# Patient Record
Sex: FEMALE | Race: WHITE | NOT HISPANIC OR LATINO | Employment: FULL TIME | URBAN - METROPOLITAN AREA
[De-identification: names, ages, dates, MRNs, and addresses within clinical notes are randomized per-mention and may not be internally consistent; named-entity substitution may affect disease eponyms.]

---

## 2021-06-08 DIAGNOSIS — K43.9 VENTRAL HERNIA: Primary | ICD-10-CM

## 2022-04-01 ENCOUNTER — TELEPHONE (OUTPATIENT)
Dept: OBSTETRICS AND GYNECOLOGY | Facility: CLINIC | Age: 47
End: 2022-04-01
Payer: COMMERCIAL

## 2022-04-01 NOTE — TELEPHONE ENCOUNTER
----- Message from Bernadine Pedroza sent at 4/1/2022 12:44 PM CDT -----  Regarding: Referral  Contact: Jessica, Health Unit in [facility]  Per phone call with Jessica, she wanted to know if Dr Lara is accepting new patient and she sent a referral over and was told that Dr Lara is not accepting new patient.  Please advised and return call at [phone].    Thanks,  SJ

## 2022-04-01 NOTE — TELEPHONE ENCOUNTER
Phone call returned. Jessica is requesting a new patient referral.  She is advised Dr Lara is not accepting new patients at this time.

## 2022-04-11 ENCOUNTER — PATIENT MESSAGE (OUTPATIENT)
Dept: OBSTETRICS AND GYNECOLOGY | Facility: CLINIC | Age: 47
End: 2022-04-11
Payer: COMMERCIAL

## 2022-04-21 ENCOUNTER — OFFICE VISIT (OUTPATIENT)
Dept: OBSTETRICS AND GYNECOLOGY | Facility: CLINIC | Age: 47
End: 2022-04-21
Payer: COMMERCIAL

## 2022-04-21 VITALS — HEART RATE: 79 BPM | SYSTOLIC BLOOD PRESSURE: 122 MMHG | WEIGHT: 273 LBS | DIASTOLIC BLOOD PRESSURE: 84 MMHG

## 2022-04-21 DIAGNOSIS — R87.611 ATYPICAL SQUAMOUS CELLS CANNOT EXCLUDE HIGH GRADE SQUAMOUS INTRAEPITHELIAL LESION ON CYTOLOGIC SMEAR OF CERVIX (ASC-H): Primary | ICD-10-CM

## 2022-04-21 PROCEDURE — 99499 UNLISTED E&M SERVICE: CPT | Mod: S$GLB,,, | Performed by: OBSTETRICS & GYNECOLOGY

## 2022-04-21 PROCEDURE — 3074F SYST BP LT 130 MM HG: CPT | Mod: CPTII,S$GLB,, | Performed by: OBSTETRICS & GYNECOLOGY

## 2022-04-21 PROCEDURE — 1159F PR MEDICATION LIST DOCUMENTED IN MEDICAL RECORD: ICD-10-PCS | Mod: CPTII,S$GLB,, | Performed by: OBSTETRICS & GYNECOLOGY

## 2022-04-21 PROCEDURE — 3079F PR MOST RECENT DIASTOLIC BLOOD PRESSURE 80-89 MM HG: ICD-10-PCS | Mod: CPTII,S$GLB,, | Performed by: OBSTETRICS & GYNECOLOGY

## 2022-04-21 PROCEDURE — 99499 NO LOS: ICD-10-PCS | Mod: S$GLB,,, | Performed by: OBSTETRICS & GYNECOLOGY

## 2022-04-21 PROCEDURE — 1159F MED LIST DOCD IN RCRD: CPT | Mod: CPTII,S$GLB,, | Performed by: OBSTETRICS & GYNECOLOGY

## 2022-04-21 PROCEDURE — 57452 EXAM OF CERVIX W/SCOPE: CPT | Mod: S$GLB,,, | Performed by: OBSTETRICS & GYNECOLOGY

## 2022-04-21 PROCEDURE — 57452 COLPOSCOPY: ICD-10-PCS | Mod: S$GLB,,, | Performed by: OBSTETRICS & GYNECOLOGY

## 2022-04-21 PROCEDURE — 3074F PR MOST RECENT SYSTOLIC BLOOD PRESSURE < 130 MM HG: ICD-10-PCS | Mod: CPTII,S$GLB,, | Performed by: OBSTETRICS & GYNECOLOGY

## 2022-04-21 PROCEDURE — 3079F DIAST BP 80-89 MM HG: CPT | Mod: CPTII,S$GLB,, | Performed by: OBSTETRICS & GYNECOLOGY

## 2022-04-21 RX ORDER — LEVOTHYROXINE SODIUM 75 UG/1
75 TABLET ORAL DAILY
COMMUNITY
Start: 2022-03-30 | End: 2023-05-18 | Stop reason: SDUPTHER

## 2022-04-21 RX ORDER — SERTRALINE HYDROCHLORIDE 100 MG/1
100 TABLET, FILM COATED ORAL DAILY
COMMUNITY
Start: 2022-01-17 | End: 2023-05-18

## 2022-04-21 RX ORDER — CALCIUM CARB/VITAMIN D3/VIT K1 500-500-40
1 TABLET,CHEWABLE ORAL DAILY
COMMUNITY
End: 2023-05-18

## 2022-04-21 RX ORDER — LEVOTHYROXINE SODIUM 75 UG/1
75 TABLET ORAL
COMMUNITY
End: 2023-12-11

## 2022-04-21 RX ORDER — METFORMIN HYDROCHLORIDE 500 MG/1
500 TABLET ORAL
COMMUNITY

## 2022-04-21 RX ORDER — METOPROLOL SUCCINATE 25 MG/1
25 TABLET, EXTENDED RELEASE ORAL DAILY
COMMUNITY
Start: 2022-01-17 | End: 2023-06-15 | Stop reason: SDUPTHER

## 2022-04-21 RX ORDER — CLOBETASOL PROPIONATE 0.5 MG/G
CREAM TOPICAL
COMMUNITY
End: 2023-05-18

## 2022-04-21 RX ORDER — BUSPIRONE HYDROCHLORIDE 10 MG/1
TABLET ORAL
COMMUNITY
Start: 2022-04-19 | End: 2023-05-18 | Stop reason: ALTCHOICE

## 2022-04-21 RX ORDER — METOPROLOL TARTRATE 25 MG/1
25 TABLET, FILM COATED ORAL
COMMUNITY
End: 2022-06-15 | Stop reason: SDUPTHER

## 2022-04-21 RX ORDER — FLUTICASONE FUROATE, UMECLIDINIUM BROMIDE AND VILANTEROL TRIFENATATE 100; 62.5; 25 UG/1; UG/1; UG/1
POWDER RESPIRATORY (INHALATION)
COMMUNITY
Start: 2022-03-26

## 2022-04-21 RX ORDER — DICLOFENAC SODIUM 75 MG/1
TABLET, DELAYED RELEASE ORAL
COMMUNITY
Start: 2021-12-17 | End: 2023-05-18

## 2022-04-21 RX ORDER — HYDROXYCHLOROQUINE SULFATE 200 MG/1
TABLET, FILM COATED ORAL
COMMUNITY

## 2022-04-21 RX ORDER — KETOCONAZOLE 20 MG/G
CREAM TOPICAL
COMMUNITY
End: 2023-05-18

## 2022-04-21 RX ORDER — SPIRONOLACTONE 100 MG/1
100 TABLET, FILM COATED ORAL
COMMUNITY
End: 2023-12-11

## 2022-04-21 NOTE — PROCEDURES
Colposcopy    Date/Time: 4/21/2022 1:30 PM  Performed by: Avinash Hernandez MD  Authorized by: Avinash Hernandez MD     Consent Done?:  Yes (Verbal)  Assistants?: Yes      Colposcopy Site:  Cervix  Position:  Supine  Acrowhite Lesion: No    Atypical Vessels: No    Transformation Zone Adequate?: Yes    Biopsy?: No    ECC Performed?: No    LEEP Performed?: No     Patient tolerated the procedure well with no immediate complications.   Post-operative instructions were provided for the patient.   Patient was discharged and will follow up if any complications occur

## 2022-06-15 ENCOUNTER — OFFICE VISIT (OUTPATIENT)
Dept: GASTROENTEROLOGY | Facility: CLINIC | Age: 47
End: 2022-06-15
Payer: COMMERCIAL

## 2022-06-15 VITALS
HEIGHT: 63 IN | DIASTOLIC BLOOD PRESSURE: 71 MMHG | OXYGEN SATURATION: 96 % | WEIGHT: 278 LBS | HEART RATE: 81 BPM | SYSTOLIC BLOOD PRESSURE: 128 MMHG | BODY MASS INDEX: 49.26 KG/M2

## 2022-06-15 DIAGNOSIS — R10.13 ABDOMINAL PAIN, EPIGASTRIC: Primary | ICD-10-CM

## 2022-06-15 DIAGNOSIS — R13.19 OTHER DYSPHAGIA: ICD-10-CM

## 2022-06-15 DIAGNOSIS — R10.11 RIGHT UPPER QUADRANT PAIN: ICD-10-CM

## 2022-06-15 DIAGNOSIS — Z12.11 SCREENING FOR COLON CANCER: ICD-10-CM

## 2022-06-15 DIAGNOSIS — K76.0 FATTY LIVER: ICD-10-CM

## 2022-06-15 PROCEDURE — 1160F PR REVIEW ALL MEDS BY PRESCRIBER/CLIN PHARMACIST DOCUMENTED: ICD-10-PCS | Mod: CPTII,S$GLB,, | Performed by: INTERNAL MEDICINE

## 2022-06-15 PROCEDURE — 3074F SYST BP LT 130 MM HG: CPT | Mod: CPTII,S$GLB,, | Performed by: INTERNAL MEDICINE

## 2022-06-15 PROCEDURE — 3008F BODY MASS INDEX DOCD: CPT | Mod: CPTII,S$GLB,, | Performed by: INTERNAL MEDICINE

## 2022-06-15 PROCEDURE — 1160F RVW MEDS BY RX/DR IN RCRD: CPT | Mod: CPTII,S$GLB,, | Performed by: INTERNAL MEDICINE

## 2022-06-15 PROCEDURE — 99213 PR OFFICE/OUTPT VISIT, EST, LEVL III, 20-29 MIN: ICD-10-PCS | Mod: S$GLB,,, | Performed by: INTERNAL MEDICINE

## 2022-06-15 PROCEDURE — 1159F MED LIST DOCD IN RCRD: CPT | Mod: CPTII,S$GLB,, | Performed by: INTERNAL MEDICINE

## 2022-06-15 PROCEDURE — 3008F PR BODY MASS INDEX (BMI) DOCUMENTED: ICD-10-PCS | Mod: CPTII,S$GLB,, | Performed by: INTERNAL MEDICINE

## 2022-06-15 PROCEDURE — 1159F PR MEDICATION LIST DOCUMENTED IN MEDICAL RECORD: ICD-10-PCS | Mod: CPTII,S$GLB,, | Performed by: INTERNAL MEDICINE

## 2022-06-15 PROCEDURE — 99213 OFFICE O/P EST LOW 20 MIN: CPT | Mod: S$GLB,,, | Performed by: INTERNAL MEDICINE

## 2022-06-15 PROCEDURE — 3078F PR MOST RECENT DIASTOLIC BLOOD PRESSURE < 80 MM HG: ICD-10-PCS | Mod: CPTII,S$GLB,, | Performed by: INTERNAL MEDICINE

## 2022-06-15 PROCEDURE — 3078F DIAST BP <80 MM HG: CPT | Mod: CPTII,S$GLB,, | Performed by: INTERNAL MEDICINE

## 2022-06-15 PROCEDURE — 3074F PR MOST RECENT SYSTOLIC BLOOD PRESSURE < 130 MM HG: ICD-10-PCS | Mod: CPTII,S$GLB,, | Performed by: INTERNAL MEDICINE

## 2022-06-15 RX ORDER — SOD SULF/POT CHLORIDE/MAG SULF 1.479 G
12 TABLET ORAL DAILY
Qty: 24 TABLET | Refills: 0 | Status: SHIPPED | OUTPATIENT
Start: 2022-06-15 | End: 2023-05-18

## 2022-06-15 NOTE — PROGRESS NOTES
Clinic Note    Reason for visit:  The primary encounter diagnosis was Abdominal pain, epigastric. Diagnoses of Other dysphagia, Right upper quadrant pain, Screening for colon cancer, and Fatty liver were also pertinent to this visit.    PCP: Channing Peterson   [facility] [address] / [place] [id number]    HPI:  This is a 46 y.o. female who is being seen for a follow up visit. H/o fatty liver, work up unrevealing. Patient reports intermittent nausea, resolves on its own. Comes and goes. Patient reports weight gain recently but has not changed diet. Currently taking GOLO supplement for weight loss (magnesium, zinc, and chromium). Also reports fatigue and frequent headaches. No prior colonoscopy. Patient also reports intermittent epigastric and RUQ pain, and at times has some chest pain. Had normal cardiac work up. Denies reflux/melena but occasionally has dysphagia with solids and liquids. Patient reports daily BMs, up to 3-4 times daily, loose stool. No blood in stool. Newly diagnosed with psoriatic arthritis. Patient wants to get gastric sleeve.     Outside record reviewed:  [facility] - 2017 MRCP: wnl, fatty liver, no GB. EGD: gitis, reflux eitis, ABx wnl, EBx wnl. HCV FibroSURE F0 A0, ALT 22, GGT 29, Tb 0.2. HCV/HAV neg, HBV sAg/cIgM/sAb neg, AMA/ZEKE/chol/Fe/T/%/ferr/INR nl. 34/32    Review of Systems   Constitutional: Positive for fatigue. Negative for chills, diaphoresis, fever and unexpected weight change.   HENT: Positive for trouble swallowing. Negative for mouth sores, nosebleeds, postnasal drip, sore throat and voice change.    Eyes: Positive for eye dryness. Negative for pain and discharge.   Respiratory: Positive for shortness of breath. Negative for apnea, cough, choking, chest tightness and wheezing.    Cardiovascular: Negative for chest pain, palpitations, leg swelling and claudication.   Gastrointestinal: Positive for abdominal pain, diarrhea and nausea. Negative for abdominal distention, anal  bleeding, blood in stool, change in bowel habit, constipation, rectal pain, vomiting, reflux, fecal incontinence and change in bowel habit.   Genitourinary: Negative for bladder incontinence, difficulty urinating, dysuria, flank pain, frequency and hematuria.   Musculoskeletal: Positive for joint swelling and joint deformity. Negative for arthralgias and back pain.   Integumentary:  Negative for color change, rash and wound.   Allergic/Immunologic: Negative for environmental allergies and food allergies.   Neurological: Positive for headaches. Negative for seizures, facial asymmetry, speech difficulty, weakness and memory loss.   Hematological: Positive for adenopathy. Bruises/bleeds easily.   Psychiatric/Behavioral: Positive for sleep disturbance. Negative for agitation, behavioral problems, confusion and hallucinations.      Past Medical History:   Diagnosis Date    Abnormal Pap smear of cervix     Anxiety     BMI 45.0-49.9, adult     Depression     Diabetes mellitus     Hashimoto's disease     Hypertension     Liver disease     fatty liver    Psoriatic arthritis     Thyroid disease      Past Surgical History:   Procedure Laterality Date    CHOLECYSTECTOMY      SHOULDER SURGERY Right     TONSILLECTOMY       Family History   Problem Relation Age of Onset    Diabetes Father     Heart disease Father     Hypertension Mother     Heart disease Mother     Heart disease Brother     Diabetes Brother      Social History     Tobacco Use    Smoking status: Never Smoker    Smokeless tobacco: Never Used   Substance Use Topics    Alcohol use: Yes     Alcohol/week: 1.0 standard drink     Types: 1 Glasses of wine per week    Drug use: Never     Review of patient's allergies indicates:  No Known Allergies     Medication List with Changes/Refills   New Medications    SOD SULF-POT CHLORIDE-MAG SULF (SUTAB) 1.479-0.188- 0.225 GRAM TABLET    Take 12 tablets by mouth once daily. Take according to package  "instructions with indicated amount of water. No breakfast day before test. May substitute with Suprep, Clenpiq, Plenvu, Moviprep or GoLytely based on Rx plan and patient preference.   Current Medications    BUSPIRONE (BUSPAR) 10 MG TABLET        CHOLECALCIFEROL, VITAMIN D3, 10 MCG (400 UNIT) CAP CAPSULE    Take 1 tablet by mouth once daily.    CLOBETASOL (TEMOVATE) 0.05 % CREAM    Apply topically.    DICLOFENAC (VOLTAREN) 75 MG EC TABLET    TAKE 1 TABLET BY MOUTH TWICE DAILY WITH FOOD FOR PAIN FOR 7 DAYS    EUTHYROX 75 MCG TABLET    Take 75 mcg by mouth once daily.    HYDROXYCHLOROQUINE (PLAQUENIL) 200 MG TABLET        KETOCONAZOLE (NIZORAL) 2 % CREAM    Apply topically.    LEVOTHYROXINE (SYNTHROID) 75 MCG TABLET    Take 75 mcg by mouth.    METFORMIN (GLUCOPHAGE) 500 MG TABLET    Take 500 mg by mouth.    METOPROLOL SUCCINATE (TOPROL-XL) 25 MG 24 HR TABLET    Take 25 mg by mouth once daily.    OMALIZUMAB (XOLAIR) 150 MG INJECTION    Inject 150 mg into the skin.    SERTRALINE (ZOLOFT) 100 MG TABLET    Take 100 mg by mouth once daily.    SPIRONOLACTONE (ALDACTONE) 100 MG TABLET    Take 100 mg by mouth.    TRELEGY ELLIPTA 100-62.5-25 MCG DSDV    INHALE 1 PUFF ONCE DAILY   Discontinued Medications    METOPROLOL TARTRATE (LOPRESSOR) 25 MG TABLET    Take 25 mg by mouth.         Vital Signs:  /71   Pulse 81   Ht 5' 3" (1.6 m)   Wt 126.1 kg (278 lb)   SpO2 96%   BMI 49.25 kg/m²         Physical Exam  Vitals reviewed.   Constitutional:       General: She is awake. She is not in acute distress.     Appearance: Normal appearance. She is well-developed. She is obese. She is not ill-appearing, toxic-appearing or diaphoretic.   HENT:      Head: Normocephalic and atraumatic.      Nose: Nose normal.      Mouth/Throat:      Mouth: Mucous membranes are moist.      Pharynx: Oropharynx is clear. No oropharyngeal exudate or posterior oropharyngeal erythema.   Eyes:      General: Lids are normal. Gaze aligned appropriately. No " scleral icterus.        Right eye: No discharge.         Left eye: No discharge.      Extraocular Movements: Extraocular movements intact.      Conjunctiva/sclera: Conjunctivae normal.   Neck:      Trachea: Trachea normal.   Cardiovascular:      Rate and Rhythm: Normal rate and regular rhythm.      Pulses:           Radial pulses are 2+ on the right side and 2+ on the left side.   Pulmonary:      Effort: Pulmonary effort is normal. No respiratory distress.      Breath sounds: Normal breath sounds. No stridor. No wheezing or rhonchi.   Chest:      Chest wall: No tenderness.   Abdominal:      General: Bowel sounds are normal. There is no distension.      Palpations: Abdomen is soft. There is no fluid wave, hepatomegaly or mass.      Tenderness: There is no abdominal tenderness. There is no guarding or rebound.   Musculoskeletal:         General: No tenderness or deformity.      Cervical back: Full passive range of motion without pain and neck supple. No tenderness.      Right lower leg: No edema.      Left lower leg: No edema.   Lymphadenopathy:      Cervical: No cervical adenopathy.   Skin:     General: Skin is warm and dry.      Capillary Refill: Capillary refill takes less than 2 seconds.      Coloration: Skin is not cyanotic, jaundiced or pale.      Findings: No rash.   Neurological:      General: No focal deficit present.      Mental Status: She is alert and oriented to person, place, and time.      Cranial Nerves: No facial asymmetry.      Motor: No tremor.   Psychiatric:         Attention and Perception: Attention normal.         Mood and Affect: Mood and affect normal.         Speech: Speech normal.         Behavior: Behavior normal. Behavior is cooperative.        All of the data above and below has been reviewed by myself and any further interpretations will be reflected in the assessment and plan.   The data includes review of external notes, and independent interpretation of lab results, procedures,  x-rays, and imaging reports.      Assessment:  Abdominal pain, epigastric  -     Ambulatory Referral to External Surgery  -     Ambulatory Referral to External Surgery    Other dysphagia  -     Ambulatory Referral to External Surgery  -     Ambulatory Referral to External Surgery    Right upper quadrant pain    Screening for colon cancer  -     Ambulatory Referral to External Surgery    Fatty liver    Other orders  -     sod sulf-pot chloride-mag sulf (SUTAB) 1.479-0.188- 0.225 gram tablet; Take 12 tablets by mouth once daily. Take according to package instructions with indicated amount of water. No breakfast day before test. May substitute with Suprep, Clenpiq, Plenvu, Moviprep or GoLytely based on Rx plan and patient preference.  Dispense: 24 tablet; Refill: 0    Plan for E/G Bx to rule out EE and H. Pylori. Will order esophageal manometry given prior EM.     Recommendations:  Schedule upper and lower endoscopies. Schedule esophageal manometry.    Risks, benefits, and alternatives of medical management, any associated procedures, and/or treatment discussed with the patient. Patient given opportunity to ask questions and voices understanding. Patient has elected to proceed with the recommended care modalities as discussed.    Follow up in about 6 months (around 12/15/2022).    Order summary:  Orders Placed This Encounter   Procedures    Ambulatory Referral to External Surgery    Ambulatory Referral to External Surgery        Gwen Deluca MD    This document may have been created using a voice recognition transcribing system. Incorrect words or phrases may have been missed during proofreading. Please interpret accordingly or contact me for clarification.

## 2022-06-15 NOTE — LETTER
Maryana 15, 2022        Channing Peterson MD  [facility] [address] [place] [address]             [place] [facility]  [address] [place] [address]  Phone: [phone]  Fax: [phone]   Patient: Karey Brown   MR Number: [id number]   YOB: 1975   Date of Visit: 6/15/2022       Dear Dr. Peterson:    Thank you for referring Karey Brown to me for evaluation. Attached you will find relevant portions of my assessment and plan of care.    If you have questions, please do not hesitate to call me. I look forward to following Karey Brown along with you.    Sincerely,      Gwen Deluca MD            CC  No Recipients    Enclosure

## 2022-06-21 ENCOUNTER — OUTSIDE PLACE OF SERVICE (OUTPATIENT)
Dept: GASTROENTEROLOGY | Facility: CLINIC | Age: 47
End: 2022-06-21

## 2022-06-21 ENCOUNTER — OUTSIDE PLACE OF SERVICE (OUTPATIENT)
Dept: GASTROENTEROLOGY | Facility: CLINIC | Age: 47
End: 2022-06-21
Payer: COMMERCIAL

## 2022-06-21 LAB
B-HCG UR QL: NEGATIVE
CRC RECOMMENDATION EXT: NORMAL
SPECIFIC GRAVITY,UA,REFRACTOMETER: 1.02 (ref 1–1.03)

## 2022-06-21 PROCEDURE — 43239 PR EGD, FLEX, W/BIOPSY, SGL/MULTI: ICD-10-PCS | Mod: ,,, | Performed by: INTERNAL MEDICINE

## 2022-06-21 PROCEDURE — 45378 DIAGNOSTIC COLONOSCOPY: CPT | Mod: ,,, | Performed by: INTERNAL MEDICINE

## 2022-06-21 PROCEDURE — 43239 EGD BIOPSY SINGLE/MULTIPLE: CPT | Mod: ,,, | Performed by: INTERNAL MEDICINE

## 2022-06-21 PROCEDURE — 45378 PR COLONOSCOPY,DIAGNOSTIC: ICD-10-PCS | Mod: ,,, | Performed by: INTERNAL MEDICINE

## 2022-06-22 LAB — SPECIMEN TO PATHOLOGY: NORMAL

## 2022-06-23 ENCOUNTER — TELEPHONE (OUTPATIENT)
Dept: GASTROENTEROLOGY | Facility: CLINIC | Age: 47
End: 2022-06-23
Payer: COMMERCIAL

## 2022-06-23 DIAGNOSIS — R10.13 ABDOMINAL PAIN, EPIGASTRIC: Primary | ICD-10-CM

## 2022-06-23 DIAGNOSIS — K31.89 RETAINED FOOD IN STOMACH: ICD-10-CM

## 2022-06-23 NOTE — TELEPHONE ENCOUNTER
GBx chr inact gitis w/o Hp, EBx nl.  CMA to notify patient. No signs of infection or precancerous cells on the upper endoscopy biopsies.  Does she have her EM scheduled? Rec GES given some retained food in her stomach. Repeat colonoscopy in 10 years.  NBP

## 2022-06-29 ENCOUNTER — PATIENT MESSAGE (OUTPATIENT)
Dept: GASTROENTEROLOGY | Facility: CLINIC | Age: 47
End: 2022-06-29
Payer: COMMERCIAL

## 2022-07-01 ENCOUNTER — TELEPHONE (OUTPATIENT)
Dept: GASTROENTEROLOGY | Facility: CLINIC | Age: 47
End: 2022-07-01
Payer: COMMERCIAL

## 2022-07-01 NOTE — TELEPHONE ENCOUNTER
----- Message from Edilma Mtz sent at 7/1/2022  2:53 PM CDT -----  .Type:  Patient Returning Call    Who Called:self  Who Left Message for Patient: sophia  Does the patient know what this is regarding?:results  Would the patient rather a call back or a response via [facility]? call  Best Call Back Number:.[phone]

## 2022-07-18 ENCOUNTER — OFFICE VISIT (OUTPATIENT)
Dept: OBSTETRICS AND GYNECOLOGY | Facility: CLINIC | Age: 47
End: 2022-07-18
Payer: COMMERCIAL

## 2022-07-18 VITALS
DIASTOLIC BLOOD PRESSURE: 89 MMHG | HEART RATE: 90 BPM | WEIGHT: 280 LBS | SYSTOLIC BLOOD PRESSURE: 136 MMHG | BODY MASS INDEX: 49.6 KG/M2

## 2022-07-18 DIAGNOSIS — R87.611 ATYPICAL SQUAMOUS CELLS CANNOT EXCLUDE HIGH GRADE SQUAMOUS INTRAEPITHELIAL LESION ON CYTOLOGIC SMEAR OF CERVIX (ASC-H): Primary | ICD-10-CM

## 2022-07-18 PROCEDURE — 3008F PR BODY MASS INDEX (BMI) DOCUMENTED: ICD-10-PCS | Mod: CPTII,S$GLB,, | Performed by: OBSTETRICS & GYNECOLOGY

## 2022-07-18 PROCEDURE — 3079F PR MOST RECENT DIASTOLIC BLOOD PRESSURE 80-89 MM HG: ICD-10-PCS | Mod: CPTII,S$GLB,, | Performed by: OBSTETRICS & GYNECOLOGY

## 2022-07-18 PROCEDURE — 99213 PR OFFICE/OUTPT VISIT, EST, LEVL III, 20-29 MIN: ICD-10-PCS | Mod: S$GLB,,, | Performed by: OBSTETRICS & GYNECOLOGY

## 2022-07-18 PROCEDURE — 3079F DIAST BP 80-89 MM HG: CPT | Mod: CPTII,S$GLB,, | Performed by: OBSTETRICS & GYNECOLOGY

## 2022-07-18 PROCEDURE — 3075F SYST BP GE 130 - 139MM HG: CPT | Mod: CPTII,S$GLB,, | Performed by: OBSTETRICS & GYNECOLOGY

## 2022-07-18 PROCEDURE — 99213 OFFICE O/P EST LOW 20 MIN: CPT | Mod: S$GLB,,, | Performed by: OBSTETRICS & GYNECOLOGY

## 2022-07-18 PROCEDURE — 3008F BODY MASS INDEX DOCD: CPT | Mod: CPTII,S$GLB,, | Performed by: OBSTETRICS & GYNECOLOGY

## 2022-07-18 PROCEDURE — 3075F PR MOST RECENT SYSTOLIC BLOOD PRESS GE 130-139MM HG: ICD-10-PCS | Mod: CPTII,S$GLB,, | Performed by: OBSTETRICS & GYNECOLOGY

## 2022-07-18 NOTE — PROGRESS NOTES
Dictation #1  MRN:[id number]  CSN:[id number]    Subjective:       Patient ID: Karey Brown is a 46 y.o. female.    Chief Complaint:  Follow-up      History of Present Illness  HPI  Here for repap- asc-h with nml colpo    GYN & OB History  No LMP recorded. Patient has had an implant.   Date of Last Pap: No result found    OB History    Para Term  AB Living   3 3 3     3   SAB IAB Ectopic Multiple Live Births                  # Outcome Date GA Lbr Edison/2nd Weight Sex Delivery Anes PTL Lv   3 Term      Vag-Spont      2 Term      Vag-Spont      1 Term      Vag-Spont          Review of Systems  Review of Systems   Constitutional: Negative for chills and fever.   Respiratory: Negative for shortness of breath.    Cardiovascular: Negative for chest pain.   Gastrointestinal: Negative for abdominal pain, blood in stool, constipation, diarrhea, nausea, vomiting and reflux.   Genitourinary: Negative for dysmenorrhea, dyspareunia, dysuria, hematuria, hot flashes, menorrhagia, menstrual problem, pelvic pain, vaginal bleeding, vaginal discharge, postcoital bleeding and vaginal dryness.   Musculoskeletal: Negative for arthralgias and joint swelling.   Integumentary:  Negative for rash and hair changes.   Psychiatric/Behavioral: Negative for depression. The patient is not nervous/anxious.            Objective:    Physical Exam:   Constitutional: She appears well-developed and well-nourished. No distress.    HENT:   Head: Normocephalic and atraumatic.    Eyes: Conjunctivae and EOM are normal.    Neck: No tracheal deviation present. No thyromegaly present.    Cardiovascular: Exam reveals no clubbing, no cyanosis and no edema.     Pulmonary/Chest: Effort normal. No respiratory distress.        Abdominal: Soft. She exhibits no distension and no mass. There is no abdominal tenderness. There is no rebound and no guarding. No hernia.     Genitourinary:    Vagina, uterus and rectum normal.      Pelvic exam was  performed with patient supine.   There is no rash, tenderness, lesion or injury on the right labia. There is no rash, tenderness, lesion or injury on the left labia. Cervix is normal. Right adnexum displays no mass, no tenderness and no fullness. Left adnexum displays no mass, no tenderness and no fullness.                Skin: She is not diaphoretic. No cyanosis. Nails show no clubbing.           Assessment:        1. Atypical squamous cells cannot exclude high grade squamous intraepithelial lesion on cytologic smear of cervix (ASC-H)                Plan:      Pap  Urine dip nml

## 2022-07-19 ENCOUNTER — OUTSIDE PLACE OF SERVICE (OUTPATIENT)
Dept: GASTROENTEROLOGY | Facility: CLINIC | Age: 47
End: 2022-07-19
Payer: COMMERCIAL

## 2022-07-19 PROCEDURE — 91037 PR GERD TST W/ NASAL IMPEDENCE ELECTROD: ICD-10-PCS | Mod: 26,,, | Performed by: INTERNAL MEDICINE

## 2022-07-19 PROCEDURE — 91010 ESOPHAGUS MOTILITY STUDY: CPT | Mod: 26,,, | Performed by: INTERNAL MEDICINE

## 2022-07-19 PROCEDURE — 91037 ESOPH IMPED FUNCTION TEST: CPT | Mod: 26,,, | Performed by: INTERNAL MEDICINE

## 2022-07-19 PROCEDURE — 91010 PR ESOPHAGEAL MOTILITY STUDY, MA2METRY: ICD-10-PCS | Mod: 26,,, | Performed by: INTERNAL MEDICINE

## 2022-07-26 ENCOUNTER — PATIENT MESSAGE (OUTPATIENT)
Dept: GASTROENTEROLOGY | Facility: CLINIC | Age: 47
End: 2022-07-26
Payer: COMMERCIAL

## 2022-07-27 LAB — Lab: NORMAL

## 2022-07-28 ENCOUNTER — PATIENT MESSAGE (OUTPATIENT)
Dept: OBSTETRICS AND GYNECOLOGY | Facility: CLINIC | Age: 47
End: 2022-07-28
Payer: COMMERCIAL

## 2022-07-28 DIAGNOSIS — D06.9 HIGH GRADE SQUAMOUS INTRAEPITHELIAL LESION (HGSIL), GRADE 3 CIN, ON BIOPSY OF CERVIX: Primary | ICD-10-CM

## 2022-08-02 ENCOUNTER — OFFICE VISIT (OUTPATIENT)
Dept: OBSTETRICS AND GYNECOLOGY | Facility: CLINIC | Age: 47
End: 2022-08-02
Payer: COMMERCIAL

## 2022-08-02 ENCOUNTER — PATIENT MESSAGE (OUTPATIENT)
Dept: GASTROENTEROLOGY | Facility: CLINIC | Age: 47
End: 2022-08-02
Payer: COMMERCIAL

## 2022-08-02 VITALS
SYSTOLIC BLOOD PRESSURE: 132 MMHG | DIASTOLIC BLOOD PRESSURE: 84 MMHG | BODY MASS INDEX: 49.6 KG/M2 | HEART RATE: 90 BPM | WEIGHT: 280 LBS

## 2022-08-02 DIAGNOSIS — R87.613 HSIL (HIGH GRADE SQUAMOUS INTRAEPITHELIAL LESION) ON PAP SMEAR OF CERVIX: Primary | ICD-10-CM

## 2022-08-02 LAB
ANION GAP SERPL CALC-SCNC: 7 MMOL/L (ref 3–11)
APPEARANCE, UA: CLEAR
B-HCG UR QL: NEGATIVE
BACTERIA SPEC CULT: ABNORMAL /HPF
BASOPHILS NFR BLD: 0.8 % (ref 0–3)
BILIRUB UR QL STRIP: NEGATIVE MG/DL
BUN SERPL-MCNC: 10 MG/DL (ref 7–18)
BUN/CREAT SERPL: 13.15 RATIO (ref 7–18)
CALCIUM SERPL-MCNC: 9.6 MG/DL (ref 8.8–10.5)
CHLORIDE SERPL-SCNC: 107 MMOL/L (ref 100–108)
CO2 SERPL-SCNC: 27 MMOL/L (ref 21–32)
COLOR UR: ABNORMAL
CREAT SERPL-MCNC: 0.76 MG/DL (ref 0.55–1.02)
EOSINOPHIL NFR BLD: 3.2 % (ref 1–3)
ERYTHROCYTE [DISTWIDTH] IN BLOOD BY AUTOMATED COUNT: 12.5 % (ref 12.5–18)
GFR ESTIMATION: > 60
GLUCOSE (UA): NORMAL MG/DL
GLUCOSE SERPL-MCNC: 101 MG/DL (ref 70–110)
HCT VFR BLD AUTO: 43.4 % (ref 37–47)
HGB BLD-MCNC: 14.5 G/DL (ref 12–16)
HGB UR QL STRIP: 25 /UL
KETONES UR QL STRIP: NEGATIVE MG/DL
LEUKOCYTE ESTERASE UR QL STRIP: NEGATIVE /UL
LYMPHOCYTES NFR BLD: 30.3 % (ref 25–40)
MCH RBC QN AUTO: 31.5 PG (ref 27–31.2)
MCHC RBC AUTO-ENTMCNC: 33.4 G/DL (ref 31.8–35.4)
MCV RBC AUTO: 94.3 FL (ref 80–97)
MONOCYTES NFR BLD: 9.8 % (ref 1–15)
NEUTROPHILS # BLD AUTO: 3.51 10*3/UL (ref 1.8–7.7)
NEUTROPHILS NFR BLD: 55.6 % (ref 37–80)
NITRITE UR QL STRIP: NEGATIVE
NUCLEATED RED BLOOD CELLS: 0 %
PH UR STRIP: 5 PH (ref 5–9)
PLATELETS: 277 10*3/UL (ref 142–424)
POTASSIUM SERPL-SCNC: 4.8 MMOL/L (ref 3.6–5.2)
PROT UR QL STRIP: 30 MG/DL
RBC # BLD AUTO: 4.6 10*6/UL (ref 4.2–5.4)
RBC #/AREA URNS HPF: ABNORMAL /HPF (ref 0–2)
SERVICE COMMENT 03: ABNORMAL
SODIUM BLD-SCNC: 141 MMOL/L (ref 135–145)
SP GR UR STRIP: 1.03 (ref 1–1.03)
SPECIMEN COLLECTION METHOD, URINE: ABNORMAL
SQUAMOUS EPITHELIAL, UA: ABNORMAL /LPF
UROBILINOGEN UR STRIP-ACNC: NORMAL MG/DL
WBC # BLD: 6.3 10*3/UL (ref 4.6–10.2)
WBC #/AREA URNS HPF: ABNORMAL /HPF (ref 0–5)

## 2022-08-02 PROCEDURE — 3075F SYST BP GE 130 - 139MM HG: CPT | Mod: CPTII,S$GLB,, | Performed by: OBSTETRICS & GYNECOLOGY

## 2022-08-02 PROCEDURE — 3008F PR BODY MASS INDEX (BMI) DOCUMENTED: ICD-10-PCS | Mod: CPTII,S$GLB,, | Performed by: OBSTETRICS & GYNECOLOGY

## 2022-08-02 PROCEDURE — 3079F PR MOST RECENT DIASTOLIC BLOOD PRESSURE 80-89 MM HG: ICD-10-PCS | Mod: CPTII,S$GLB,, | Performed by: OBSTETRICS & GYNECOLOGY

## 2022-08-02 PROCEDURE — 3075F PR MOST RECENT SYSTOLIC BLOOD PRESS GE 130-139MM HG: ICD-10-PCS | Mod: CPTII,S$GLB,, | Performed by: OBSTETRICS & GYNECOLOGY

## 2022-08-02 PROCEDURE — 3008F BODY MASS INDEX DOCD: CPT | Mod: CPTII,S$GLB,, | Performed by: OBSTETRICS & GYNECOLOGY

## 2022-08-02 PROCEDURE — 99499 UNLISTED E&M SERVICE: CPT | Mod: S$GLB,,, | Performed by: OBSTETRICS & GYNECOLOGY

## 2022-08-02 PROCEDURE — 99499 NO LOS: ICD-10-PCS | Mod: S$GLB,,, | Performed by: OBSTETRICS & GYNECOLOGY

## 2022-08-02 PROCEDURE — 3079F DIAST BP 80-89 MM HG: CPT | Mod: CPTII,S$GLB,, | Performed by: OBSTETRICS & GYNECOLOGY

## 2022-08-02 NOTE — PROGRESS NOTES
46 y.o.  presents for pre-op H&P for ckc.  No LMP recorded. Patient has had an implant..    Past Medical History:   Diagnosis Date    Abnormal Pap smear of cervix     Anxiety     BMI 45.0-49.9, adult     Depression     Diabetes mellitus     Hashimoto's disease     Hypertension     Liver disease     fatty liver    Psoriatic arthritis     Thyroid disease      Past Surgical History:   Procedure Laterality Date    CHOLECYSTECTOMY      SHOULDER SURGERY Right     TONSILLECTOMY       Family History   Problem Relation Age of Onset    Diabetes Father     Heart disease Father     Hypertension Mother     Heart disease Mother     Heart disease Brother     Diabetes Brother      Review of patient's allergies indicates:  No Known Allergies    Current Outpatient Medications:     busPIRone (BUSPAR) 10 MG tablet, , Disp: , Rfl:     cholecalciferol, vitamin D3, 10 mcg (400 unit) Cap capsule, Take 1 tablet by mouth once daily., Disp: , Rfl:     clobetasoL (TEMOVATE) 0.05 % cream, Apply topically., Disp: , Rfl:     diclofenac (VOLTAREN) 75 MG EC tablet, TAKE 1 TABLET BY MOUTH TWICE DAILY WITH FOOD FOR PAIN FOR 7 DAYS, Disp: , Rfl:     EUTHYROX 75 mcg tablet, Take 75 mcg by mouth once daily., Disp: , Rfl:     hydrOXYchloroQUINE (PLAQUENIL) 200 mg tablet, , Disp: , Rfl:     ketoconazole (NIZORAL) 2 % cream, Apply topically., Disp: , Rfl:     levothyroxine (SYNTHROID) 75 MCG tablet, Take 75 mcg by mouth., Disp: , Rfl:     metFORMIN (GLUCOPHAGE) 500 MG tablet, Take 500 mg by mouth., Disp: , Rfl:     metoprolol succinate (TOPROL-XL) 25 MG 24 hr tablet, Take 25 mg by mouth once daily., Disp: , Rfl:     omalizumab (XOLAIR) 150 mg injection, Inject 150 mg into the skin., Disp: , Rfl:     pantoprazole (PROTONIX) 40 MG tablet, Take 1 tablet (40 mg total) by mouth 2 (two) times daily., Disp: 180 tablet, Rfl: 1    sertraline (ZOLOFT) 100 MG tablet, Take 100 mg by mouth once daily., Disp: , Rfl:     sod  sulf-pot chloride-mag sulf (SUTAB) 1.479-0.188- 0.225 gram tablet, Take 12 tablets by mouth once daily. Take according to package instructions with indicated amount of water. No breakfast day before test. May substitute with Suprep, Clenpiq, Plenvu, Moviprep or GoLytely based on Rx plan and patient preference., Disp: 24 tablet, Rfl: 0    spironolactone (ALDACTONE) 100 MG tablet, Take 100 mg by mouth., Disp: , Rfl:     TRELEGY ELLIPTA 100-62.5-25 mcg DsDv, INHALE 1 PUFF ONCE DAILY, Disp: , Rfl:   Social History     Socioeconomic History    Marital status:    Tobacco Use    Smoking status: Never Smoker    Smokeless tobacco: Never Used   Substance and Sexual Activity    Alcohol use: Yes     Alcohol/week: 1.0 standard drink     Types: 1 Glasses of wine per week    Drug use: Never    Sexual activity: Yes     Partners: Male     Birth control/protection: I.U.D.       Review of Systems   Constitutional: Negative for chills and fever.   Respiratory: Negative for shortness of breath.    Cardiovascular: Negative for chest pain.   Gastrointestinal: Negative for abdominal pain, blood in stool, constipation, diarrhea, nausea, vomiting and reflux.   Genitourinary: Negative for dysmenorrhea, dyspareunia, dysuria, hematuria, hot flashes, menorrhagia, menstrual problem, pelvic pain, vaginal bleeding, vaginal discharge, postcoital bleeding and vaginal dryness.   Musculoskeletal: Negative for arthralgias and joint swelling.   Integumentary:  Negative for rash and hair changes.   Psychiatric/Behavioral: Negative for depression. The patient is not nervous/anxious.        There were no vitals filed for this visit.    Physical Exam:   Constitutional: She appears well-developed and well-nourished. No distress.    HENT:   Head: Normocephalic.    Eyes: Conjunctivae and EOM are normal.    Neck: No tracheal deviation present. No thyromegaly present.    Cardiovascular: Exam reveals no clubbing, no cyanosis and no edema.      Pulmonary/Chest: Effort normal. No respiratory distress.                  Musculoskeletal: Normal range of motion and moves all extremeties.        Skin: No rash noted. She is not diaphoretic. No cyanosis. Nails show no clubbing.    Psychiatric: She has a normal mood and affect. Her behavior is normal. Judgment and thought content normal.       Last pap hsil      Assessment: hsil    Plan: c  I have discussed the risks, benefits, indications, and alternatives of the procedure in detail.  The patient verbalizes her understanding.  All questions answered.  Consents signed.  The patient agrees to proceed to proceed as planned.

## 2022-08-03 ENCOUNTER — OUTSIDE PLACE OF SERVICE (OUTPATIENT)
Dept: OBSTETRICS AND GYNECOLOGY | Facility: CLINIC | Age: 47
End: 2022-08-03
Payer: COMMERCIAL

## 2022-08-03 LAB — GLUCOSE SERPL-MCNC: 92 MG/DL (ref 70–105)

## 2022-08-03 PROCEDURE — 57520 PR CONIZATION CERVIX,KNIFE/LASER: ICD-10-PCS | Mod: ,,, | Performed by: OBSTETRICS & GYNECOLOGY

## 2022-08-03 PROCEDURE — 57520 CONIZATION OF CERVIX: CPT | Mod: ,,, | Performed by: OBSTETRICS & GYNECOLOGY

## 2022-08-04 LAB — SPECIMEN TO PATHOLOGY: NORMAL

## 2022-08-15 ENCOUNTER — OFFICE VISIT (OUTPATIENT)
Dept: OBSTETRICS AND GYNECOLOGY | Facility: CLINIC | Age: 47
End: 2022-08-15
Payer: COMMERCIAL

## 2022-08-15 VITALS
DIASTOLIC BLOOD PRESSURE: 84 MMHG | WEIGHT: 275 LBS | HEART RATE: 84 BPM | BODY MASS INDEX: 48.71 KG/M2 | SYSTOLIC BLOOD PRESSURE: 128 MMHG

## 2022-08-15 DIAGNOSIS — Z09 POSTOP CHECK: Primary | ICD-10-CM

## 2022-08-15 PROCEDURE — 99499 NO LOS: ICD-10-PCS | Mod: S$GLB,,, | Performed by: OBSTETRICS & GYNECOLOGY

## 2022-08-15 PROCEDURE — 3079F PR MOST RECENT DIASTOLIC BLOOD PRESSURE 80-89 MM HG: ICD-10-PCS | Mod: CPTII,S$GLB,, | Performed by: OBSTETRICS & GYNECOLOGY

## 2022-08-15 PROCEDURE — 99499 UNLISTED E&M SERVICE: CPT | Mod: S$GLB,,, | Performed by: OBSTETRICS & GYNECOLOGY

## 2022-08-15 PROCEDURE — 3074F SYST BP LT 130 MM HG: CPT | Mod: CPTII,S$GLB,, | Performed by: OBSTETRICS & GYNECOLOGY

## 2022-08-15 PROCEDURE — 3008F BODY MASS INDEX DOCD: CPT | Mod: CPTII,S$GLB,, | Performed by: OBSTETRICS & GYNECOLOGY

## 2022-08-15 PROCEDURE — 3079F DIAST BP 80-89 MM HG: CPT | Mod: CPTII,S$GLB,, | Performed by: OBSTETRICS & GYNECOLOGY

## 2022-08-15 PROCEDURE — 3074F PR MOST RECENT SYSTOLIC BLOOD PRESSURE < 130 MM HG: ICD-10-PCS | Mod: CPTII,S$GLB,, | Performed by: OBSTETRICS & GYNECOLOGY

## 2022-08-15 PROCEDURE — 3008F PR BODY MASS INDEX (BMI) DOCUMENTED: ICD-10-PCS | Mod: CPTII,S$GLB,, | Performed by: OBSTETRICS & GYNECOLOGY

## 2022-09-21 ENCOUNTER — DOCUMENTATION ONLY (OUTPATIENT)
Dept: GASTROENTEROLOGY | Facility: CLINIC | Age: 47
End: 2022-09-21
Payer: COMMERCIAL

## 2023-02-20 ENCOUNTER — OFFICE VISIT (OUTPATIENT)
Dept: OBSTETRICS AND GYNECOLOGY | Facility: CLINIC | Age: 48
End: 2023-02-20
Payer: COMMERCIAL

## 2023-02-20 VITALS
DIASTOLIC BLOOD PRESSURE: 84 MMHG | WEIGHT: 228 LBS | HEART RATE: 98 BPM | SYSTOLIC BLOOD PRESSURE: 136 MMHG | BODY MASS INDEX: 40.39 KG/M2

## 2023-02-20 DIAGNOSIS — R87.613 HSIL (HIGH GRADE SQUAMOUS INTRAEPITHELIAL LESION) ON PAP SMEAR OF CERVIX: Primary | ICD-10-CM

## 2023-02-20 PROCEDURE — 3008F PR BODY MASS INDEX (BMI) DOCUMENTED: ICD-10-PCS | Mod: CPTII,S$GLB,, | Performed by: OBSTETRICS & GYNECOLOGY

## 2023-02-20 PROCEDURE — 3075F PR MOST RECENT SYSTOLIC BLOOD PRESS GE 130-139MM HG: ICD-10-PCS | Mod: CPTII,S$GLB,, | Performed by: OBSTETRICS & GYNECOLOGY

## 2023-02-20 PROCEDURE — 3008F BODY MASS INDEX DOCD: CPT | Mod: CPTII,S$GLB,, | Performed by: OBSTETRICS & GYNECOLOGY

## 2023-02-20 PROCEDURE — 99213 PR OFFICE/OUTPT VISIT, EST, LEVL III, 20-29 MIN: ICD-10-PCS | Mod: S$GLB,,, | Performed by: OBSTETRICS & GYNECOLOGY

## 2023-02-20 PROCEDURE — 3075F SYST BP GE 130 - 139MM HG: CPT | Mod: CPTII,S$GLB,, | Performed by: OBSTETRICS & GYNECOLOGY

## 2023-02-20 PROCEDURE — 3079F PR MOST RECENT DIASTOLIC BLOOD PRESSURE 80-89 MM HG: ICD-10-PCS | Mod: CPTII,S$GLB,, | Performed by: OBSTETRICS & GYNECOLOGY

## 2023-02-20 PROCEDURE — 3079F DIAST BP 80-89 MM HG: CPT | Mod: CPTII,S$GLB,, | Performed by: OBSTETRICS & GYNECOLOGY

## 2023-02-20 PROCEDURE — 99213 OFFICE O/P EST LOW 20 MIN: CPT | Mod: S$GLB,,, | Performed by: OBSTETRICS & GYNECOLOGY

## 2023-02-20 NOTE — PROGRESS NOTES
Subjective:       Patient ID: Karey Brown is a 47 y.o. female.    Chief Complaint:  Abnormal Pap Smear      History of Present Illness  HPI  Here for repap- hsil with [facility]  No compl    GYN & OB History  No LMP recorded. Patient has had an implant.   Date of Last Pap: No result found    OB History    Para Term  AB Living   3 3 3     3   SAB IAB Ectopic Multiple Live Births                  # Outcome Date GA Lbr Edison/2nd Weight Sex Delivery Anes PTL Lv   3 Term      Vag-Spont      2 Term      Vag-Spont      1 Term      Vag-Spont          Review of Systems  Review of Systems   Constitutional:  Negative for chills and fever.   Respiratory:  Negative for shortness of breath.    Cardiovascular:  Negative for chest pain.   Gastrointestinal:  Negative for abdominal pain, blood in stool, constipation, diarrhea, nausea, vomiting and reflux.   Genitourinary:  Negative for dysmenorrhea, dyspareunia, dysuria, hematuria, hot flashes, menorrhagia, menstrual problem, pelvic pain, vaginal bleeding, vaginal discharge, postcoital bleeding and vaginal dryness.   Musculoskeletal:  Negative for arthralgias and joint swelling.   Integumentary:  Negative for rash and hair changes.   Psychiatric/Behavioral:  Negative for depression. The patient is not nervous/anxious.          Objective:    Physical Exam:   Constitutional: She appears well-developed and well-nourished. No distress.    HENT:   Head: Normocephalic and atraumatic.    Eyes: Conjunctivae and EOM are normal.    Neck: No tracheal deviation present. No thyromegaly present.    Cardiovascular:       Exam reveals no clubbing, no cyanosis and no edema.        Pulmonary/Chest: Effort normal. No respiratory distress.        Abdominal: Soft. She exhibits no distension and no mass. There is no abdominal tenderness. There is no rebound and no guarding. No hernia.     Genitourinary:    Vagina, uterus and rectum normal.      Pelvic exam was performed with patient  supine.   There is no rash, tenderness, lesion or injury on the right labia. There is no rash, tenderness, lesion or injury on the left labia. Cervix is normal. Right adnexum displays no mass, no tenderness and no fullness. Left adnexum displays no mass, no tenderness and no fullness.                Skin: She is not diaphoretic. No cyanosis. Nails show no clubbing.         Assessment:        1. HSIL (high grade squamous intraepithelial lesion) on Pap smear of cervix               Plan:      repap

## 2023-02-27 ENCOUNTER — PATIENT MESSAGE (OUTPATIENT)
Dept: OBSTETRICS AND GYNECOLOGY | Facility: CLINIC | Age: 48
End: 2023-02-27
Payer: COMMERCIAL

## 2023-03-02 LAB — Lab: NORMAL

## 2023-03-06 ENCOUNTER — PATIENT MESSAGE (OUTPATIENT)
Dept: OBSTETRICS AND GYNECOLOGY | Facility: CLINIC | Age: 48
End: 2023-03-06
Payer: COMMERCIAL

## 2023-05-18 ENCOUNTER — OFFICE VISIT (OUTPATIENT)
Dept: FAMILY MEDICINE | Facility: CLINIC | Age: 48
End: 2023-05-18
Payer: COMMERCIAL

## 2023-05-18 VITALS
OXYGEN SATURATION: 97 % | TEMPERATURE: 97 F | SYSTOLIC BLOOD PRESSURE: 102 MMHG | HEART RATE: 76 BPM | BODY MASS INDEX: 39.16 KG/M2 | WEIGHT: 221 LBS | DIASTOLIC BLOOD PRESSURE: 64 MMHG | HEIGHT: 63 IN

## 2023-05-18 DIAGNOSIS — E66.9 CLASS 2 OBESITY WITH BODY MASS INDEX (BMI) OF 39.0 TO 39.9 IN ADULT, UNSPECIFIED OBESITY TYPE, UNSPECIFIED WHETHER SERIOUS COMORBIDITY PRESENT: ICD-10-CM

## 2023-05-18 DIAGNOSIS — E11.9 TYPE 2 DIABETES MELLITUS WITHOUT COMPLICATION, WITHOUT LONG-TERM CURRENT USE OF INSULIN: ICD-10-CM

## 2023-05-18 DIAGNOSIS — I10 PRIMARY HYPERTENSION: ICD-10-CM

## 2023-05-18 DIAGNOSIS — E03.9 ACQUIRED HYPOTHYROIDISM: ICD-10-CM

## 2023-05-18 DIAGNOSIS — Z76.89 ENCOUNTER TO ESTABLISH CARE: Primary | ICD-10-CM

## 2023-05-18 PROBLEM — L50.1 CHRONIC IDIOPATHIC URTICARIA: Status: ACTIVE | Noted: 2020-10-05

## 2023-05-18 PROCEDURE — 3074F SYST BP LT 130 MM HG: CPT | Mod: CPTII,,, | Performed by: NURSE PRACTITIONER

## 2023-05-18 PROCEDURE — 99204 PR OFFICE/OUTPT VISIT, NEW, LEVL IV, 45-59 MIN: ICD-10-PCS | Mod: ,,, | Performed by: NURSE PRACTITIONER

## 2023-05-18 PROCEDURE — 3008F BODY MASS INDEX DOCD: CPT | Mod: CPTII,,, | Performed by: NURSE PRACTITIONER

## 2023-05-18 PROCEDURE — 99204 OFFICE O/P NEW MOD 45 MIN: CPT | Mod: ,,, | Performed by: NURSE PRACTITIONER

## 2023-05-18 PROCEDURE — 3074F PR MOST RECENT SYSTOLIC BLOOD PRESSURE < 130 MM HG: ICD-10-PCS | Mod: CPTII,,, | Performed by: NURSE PRACTITIONER

## 2023-05-18 PROCEDURE — 3008F PR BODY MASS INDEX (BMI) DOCUMENTED: ICD-10-PCS | Mod: CPTII,,, | Performed by: NURSE PRACTITIONER

## 2023-05-18 PROCEDURE — 3078F DIAST BP <80 MM HG: CPT | Mod: CPTII,,, | Performed by: NURSE PRACTITIONER

## 2023-05-18 PROCEDURE — 3078F PR MOST RECENT DIASTOLIC BLOOD PRESSURE < 80 MM HG: ICD-10-PCS | Mod: CPTII,,, | Performed by: NURSE PRACTITIONER

## 2023-05-18 NOTE — PROGRESS NOTES
Subjective:       Patient ID: Karey Brown is a 47 y.o. female.    Chief Complaint: Establish Care    HPI  Patient presents to the clinic to establish care. Followed by multiple specialists, compliant with meds and appts.   Ophthalmologist:  LARISSA Krishna  Pulmonologist:  Cory Troncoso  Surgeon:  Foster KOWALSKI  Immunologist: Odilon Sepulveda in [place], [place]  Gynecologist:  Avinash KOWALSKI  Dermatologist: Emma KOWALSKI  Rheumatologist:  Lydia Ashby in [place], [place]  Past Medical History:   Diagnosis Date    Abnormal Pap smear of cervix     Anxiety     BMI 45.0-49.9, adult     Depression     Diabetes mellitus     Diabetes mellitus, type 2     Hashimoto's disease     Hypertension     Liver disease     fatty liver    Psoriatic arthritis     Thyroid disease      Social History     Tobacco Use    Smoking status: Never    Smokeless tobacco: Never   Substance Use Topics    Alcohol use: Yes     Alcohol/week: 1.0 standard drink     Types: 1 Glasses of wine per week    Drug use: Never     Past Surgical History:   Procedure Laterality Date    CHOLECYSTECTOMY      cold knife cone      SHOULDER SURGERY Right     TONSILLECTOMY       Family History   Problem Relation Age of Onset    Diabetes Father     Heart disease Father     Hypertension Mother     Heart disease Mother     Heart disease Brother     Diabetes Brother      Review of Systems   Constitutional:  Negative for activity change and unexpected weight change.   HENT:  Negative for hearing loss, rhinorrhea and trouble swallowing.    Eyes:  Negative for discharge and visual disturbance.   Respiratory:  Negative for chest tightness and wheezing.    Cardiovascular:  Negative for palpitations.   Gastrointestinal:  Negative for blood in stool, constipation, diarrhea and vomiting.   Endocrine: Negative for polydipsia and polyuria.   Genitourinary:  Positive for menstrual problem. Negative for difficulty urinating, dysuria and hematuria.   Musculoskeletal:   Positive for arthralgias, joint swelling and neck pain.   Neurological:  Positive for weakness and headaches.   Psychiatric/Behavioral:  Positive for dysphoric mood. Negative for suicidal ideas.    All other systems reviewed and are negative.     Objective:      Physical Exam   Constitutional: She is oriented to person, place, and time.   HENT:   Head: Normocephalic and atraumatic.   Nose: Nose normal.   Mouth/Throat: Mucous membranes are moist. Oropharynx is clear.   Eyes: Pupils are equal, round, and reactive to light. Conjunctivae are normal.   Cardiovascular: Normal rate, regular rhythm and normal heart sounds.   Pulmonary/Chest: Effort normal and breath sounds normal.   Abdominal: Normal appearance.   Musculoskeletal:         General: Normal range of motion.      Cervical back: Normal range of motion and neck supple.   Neurological: She is alert and oriented to person, place, and time.   Skin: Skin is warm and dry.   Psychiatric: Her behavior is normal. Mood, judgment and thought content normal.   Nursing note and vitals reviewed.    Vitals:    05/18/23 0924   BP: 102/64   Pulse: 76   Temp: 96.8 °F (36 °C)     Assessment/Plan:       1. Encounter to establish care    2. Type 2 diabetes mellitus without complication, without long-term current use of insulin    3. Primary hypertension    4. Class 2 obesity with body mass index (BMI) of 39.0 to 39.9 in adult, unspecified obesity type, unspecified whether serious comorbidity present    5. Acquired hypothyroidism          1. Encounter to establish care  - Hepatitis C Antibody; Future  - HIV 1/2 Ag/Ab (4th Gen); Future  - Lipid Panel; Future  - Vitamin D; Future  - TSH; Future  - Comprehensive Metabolic Panel; Future  - CBC Auto Differential; Future    2. Type 2 diabetes mellitus without complication, without long-term current use of insulin  Pt out of med x1 month, restart at lowest dose.- tirzepatide 12.5 mg/0.5 mL PnIj; Inject 12.5 mg into the skin every 7 days.   Dispense: 4 pen; Refill: 1    - Hemoglobin A1C; Future    3. Primary hypertension  Well controlled. 102/64  Continue meds as rx.   Low Sodium Diet (DASH Diet - Less than 2 grams of sodium per day).  Monitor blood pressure daily and log. Report consistent numbers greater than 140/90.  Maintain healthy weight with goal BMI <30. Exercise 30 minutes per day, 5 days per week.    4. Acquired hypothyroidism  TSH pending. Adjust current synthroid dose prn    Appt pending 5/24/2023 with Dr Foster Stewart for evaluation of incisional hernia.     Follow up for pending lab orders from specialists and lab results from previous PCP.Call sooner if needed.

## 2023-05-19 DIAGNOSIS — R10.9 ABDOMINAL PAIN: Primary | ICD-10-CM

## 2023-05-24 ENCOUNTER — OFFICE VISIT (OUTPATIENT)
Dept: SURGERY | Facility: CLINIC | Age: 48
End: 2023-05-24
Payer: COMMERCIAL

## 2023-05-24 DIAGNOSIS — K43.2 VENTRAL INCISIONAL HERNIA WITHOUT OBSTRUCTION OR GANGRENE: Primary | ICD-10-CM

## 2023-05-24 DIAGNOSIS — R10.11 RIGHT UPPER QUADRANT ABDOMINAL PAIN: ICD-10-CM

## 2023-05-24 PROBLEM — E66.812 CLASS 2 OBESITY WITH BODY MASS INDEX (BMI) OF 39.0 TO 39.9 IN ADULT: Status: ACTIVE | Noted: 2023-05-24

## 2023-05-24 PROBLEM — I10 PRIMARY HYPERTENSION: Status: ACTIVE | Noted: 2023-05-24

## 2023-05-24 PROBLEM — F41.9 ANXIETY: Status: ACTIVE | Noted: 2023-05-24

## 2023-05-24 PROBLEM — E06.3 HASHIMOTO'S DISEASE: Status: ACTIVE | Noted: 2023-05-24

## 2023-05-24 PROBLEM — E66.9 CLASS 2 OBESITY WITH BODY MASS INDEX (BMI) OF 39.0 TO 39.9 IN ADULT: Status: ACTIVE | Noted: 2023-05-24

## 2023-05-24 PROBLEM — F32.9 REACTIVE DEPRESSION: Status: ACTIVE | Noted: 2023-05-24

## 2023-05-24 PROBLEM — E11.9 TYPE 2 DIABETES MELLITUS WITHOUT COMPLICATION, WITHOUT LONG-TERM CURRENT USE OF INSULIN: Status: ACTIVE | Noted: 2023-05-24

## 2023-05-24 PROBLEM — L40.50 PSORIATIC ARTHRITIS: Status: ACTIVE | Noted: 2023-05-24

## 2023-05-24 PROBLEM — E03.9 ACQUIRED HYPOTHYROIDISM: Status: ACTIVE | Noted: 2023-05-24

## 2023-05-24 PROCEDURE — 99203 PR OFFICE/OUTPT VISIT, NEW, LEVL III, 30-44 MIN: ICD-10-PCS | Mod: S$GLB,,, | Performed by: SURGERY

## 2023-05-24 PROCEDURE — 1160F PR REVIEW ALL MEDS BY PRESCRIBER/CLIN PHARMACIST DOCUMENTED: ICD-10-PCS | Mod: CPTII,S$GLB,, | Performed by: SURGERY

## 2023-05-24 PROCEDURE — 99203 OFFICE O/P NEW LOW 30 MIN: CPT | Mod: S$GLB,,, | Performed by: SURGERY

## 2023-05-24 PROCEDURE — 1160F RVW MEDS BY RX/DR IN RCRD: CPT | Mod: CPTII,S$GLB,, | Performed by: SURGERY

## 2023-05-24 PROCEDURE — 1159F MED LIST DOCD IN RCRD: CPT | Mod: CPTII,S$GLB,, | Performed by: SURGERY

## 2023-05-24 PROCEDURE — 1159F PR MEDICATION LIST DOCUMENTED IN MEDICAL RECORD: ICD-10-PCS | Mod: CPTII,S$GLB,, | Performed by: SURGERY

## 2023-05-24 NOTE — PROGRESS NOTES
History & Physical    SUBJECTIVE:     History of Present Illness:      47-year-old female referred to me for a ventral hernia.  Couple years ago patient had an open gallbladder surgery in the subcostal right upper quadrant incision.  She is now developed a bulge that occurs just lateral to the extent of the skin incision.  It is reducible.  Only occasional mild pain associated with it.  Ultrasound confirms a 2.1 cm fascial defect in the lateral extent of the incision.  At this time the hernia is not out.  Recent weight loss of approximately 60 lb.  No history of incarceration or obstruction  Chief Complaint   Patient presents with    Hernia         Review of patient's allergies indicates:  Review of patient's allergies indicates:   Allergen Reactions    Levofloxacin Other (See Comments)       Current Outpatient Medications on File Prior to Visit   Medication Sig Dispense Refill    hydrOXYchloroQUINE (PLAQUENIL) 200 mg tablet       levothyroxine (SYNTHROID) 75 MCG tablet Take 75 mcg by mouth.      metFORMIN (GLUCOPHAGE) 500 MG tablet Take 500 mg by mouth.      metoprolol succinate (TOPROL-XL) 25 MG 24 hr tablet Take 25 mg by mouth once daily.      omalizumab (XOLAIR) 150 mg injection Inject 300 mg into the skin.      pantoprazole (PROTONIX) 40 MG tablet Take 1 tablet (40 mg total) by mouth 2 (two) times daily. 180 tablet 1    spironolactone (ALDACTONE) 100 MG tablet Take 100 mg by mouth.      tirzepatide 12.5 mg/0.5 mL PnIj Inject 12.5 mg into the skin every 7 days. 4 pen 1    TRELEGY ELLIPTA 100-62.5-25 mcg DsDv INHALE 1 PUFF ONCE DAILY       No current facility-administered medications on file prior to visit.       Past Medical History:   Diagnosis Date    Abnormal Pap smear of cervix     Anxiety     BMI 45.0-49.9, adult     Depression     Diabetes mellitus     Diabetes mellitus, type 2     Hashimoto's disease     Hypertension     Liver disease     fatty liver    Psoriatic arthritis     Thyroid disease      Past  Surgical History:   Procedure Laterality Date    CHOLECYSTECTOMY      cold knife cone      SHOULDER SURGERY Right     TONSILLECTOMY       Family History   Problem Relation Age of Onset    Diabetes Father     Heart disease Father     Hypertension Mother     Heart disease Mother     Heart disease Brother     Diabetes Brother        Social History     Socioeconomic History    Marital status:    Tobacco Use    Smoking status: Never    Smokeless tobacco: Never   Substance and Sexual Activity    Alcohol use: Yes     Alcohol/week: 1.0 standard drink     Types: 1 Glasses of wine per week    Drug use: Never    Sexual activity: Yes     Partners: Male     Birth control/protection: I.U.D.          Review of Systems   Constitutional:  Positive for weight loss.   Respiratory: Negative.     Cardiovascular: Negative.    Gastrointestinal: Negative.    Genitourinary: Negative.    Musculoskeletal: Negative.    Neurological: Negative.    Endo/Heme/Allergies: Negative.    Psychiatric/Behavioral: Negative.       OBJECTIVE:     There were no vitals filed for this visit.              Physical Exam:  Physical Exam  Constitutional:       Appearance: She is obese.   HENT:      Head: Normocephalic.   Eyes:      Pupils: Pupils are equal, round, and reactive to light.   Cardiovascular:      Rate and Rhythm: Normal rate and regular rhythm.   Pulmonary:      Effort: Pulmonary effort is normal.      Breath sounds: Normal breath sounds.   Abdominal:      General: Bowel sounds are normal.      Palpations: Abdomen is soft.      Tenderness: There is no abdominal tenderness.      Hernia: A hernia is present.          Comments: Fascial defect noted lateral extent of right subcostal incision.  No contents at this time.  Mild tenderness.   Musculoskeletal:         General: Normal range of motion.      Cervical back: Normal range of motion.   Skin:     General: Skin is warm and dry.      Coloration: Skin is not jaundiced.   Neurological:       General: No focal deficit present.      Mental Status: She is alert and oriented to person, place, and time.   Psychiatric:         Mood and Affect: Mood normal.         Behavior: Behavior normal.           ASSESSMENT/PLAN:   Ventral incisional hernia right upper quadrant  PLAN:  We discussed ventral incisional hernia repair with mesh.  Patient currently plans on losing more weight which I think would be great and advantageous prior to surgical intervention.  We will plan to see her back in December and schedule surgery for the 1st part of next year, Per patient request.  Told patient if she becomes more symptomatic before December to please call and we can get the hernia repaired sooner if necessary.

## 2023-05-25 ENCOUNTER — DOCUMENTATION ONLY (OUTPATIENT)
Dept: ADMINISTRATIVE | Facility: HOSPITAL | Age: 48
End: 2023-05-25
Payer: COMMERCIAL

## 2023-05-25 NOTE — LETTER
AUTHORIZATION FOR RELEASE OF   CONFIDENTIAL INFORMATION    [facility],    We are seeing Karey Brown, date of birth 1975, in the clinic at [facility]. Simran Lilly NP is the patient's PCP. Karey Brown has an outstanding lab/procedure at the time we reviewed her chart. In order to help keep her health information updated, she has authorized us to request the following medical record(s):        ( x )  MAMMOGRAM                                      (  )  COLONOSCOPY      (  )  PAP SMEAR                                          (  )  OUTSIDE LAB RESULTS     (  )  DEXA SCAN                                          (  )  EYE EXAM            (  )  FOOT EXAM                                          (  )  ENTIRE RECORD     (  )  OUTSIDE IMMUNIZATIONS                 (  )  _______________         Please fax records to [facility], Dawn A Rosteet, NP, [phone].           Patient Name: Karey Brown  : 1975  Patient Phone #: [phone]

## 2023-06-14 ENCOUNTER — TELEPHONE (OUTPATIENT)
Dept: FAMILY MEDICINE | Facility: CLINIC | Age: 48
End: 2023-06-14
Payer: COMMERCIAL

## 2023-06-14 DIAGNOSIS — I10 PRIMARY HYPERTENSION: Primary | ICD-10-CM

## 2023-06-14 NOTE — TELEPHONE ENCOUNTER
----- Message from Rhina Lyle sent at 6/14/2023  3:46 PM CDT -----  Regarding: refill      Pt requests an urgent refill of     metoprolol succinate (TOPROL-XL) 25 MG 24 hr tablet    At [facility] pharmacy in     Pt states that at her visit lamar said she would refill it. Pt states the pharmacy has never received that refill and pt is completely out    [phone]

## 2023-06-15 RX ORDER — METOPROLOL SUCCINATE 25 MG/1
25 TABLET, EXTENDED RELEASE ORAL DAILY
Qty: 30 TABLET | Refills: 5 | Status: SHIPPED | OUTPATIENT
Start: 2023-06-15

## 2023-07-11 ENCOUNTER — OFFICE VISIT (OUTPATIENT)
Dept: GASTROENTEROLOGY | Facility: CLINIC | Age: 48
End: 2023-07-11
Payer: COMMERCIAL

## 2023-07-11 VITALS
SYSTOLIC BLOOD PRESSURE: 114 MMHG | BODY MASS INDEX: 37.03 KG/M2 | HEART RATE: 80 BPM | WEIGHT: 209 LBS | OXYGEN SATURATION: 98 % | HEIGHT: 63 IN | DIASTOLIC BLOOD PRESSURE: 83 MMHG

## 2023-07-11 DIAGNOSIS — K22.4 HYPERCONTRACTILE ESOPHAGUS: ICD-10-CM

## 2023-07-11 DIAGNOSIS — K76.0 FATTY LIVER: ICD-10-CM

## 2023-07-11 DIAGNOSIS — K22.4 JACKHAMMER ESOPHAGUS: Primary | ICD-10-CM

## 2023-07-11 DIAGNOSIS — K22.2 ESOPHAGOGASTRIC JUNCTION OUTFLOW OBSTRUCTION: ICD-10-CM

## 2023-07-11 DIAGNOSIS — Z12.11 SCREENING FOR COLON CANCER: ICD-10-CM

## 2023-07-11 DIAGNOSIS — R13.19 OTHER DYSPHAGIA: ICD-10-CM

## 2023-07-11 PROCEDURE — 3074F SYST BP LT 130 MM HG: CPT | Mod: CPTII,S$GLB,, | Performed by: INTERNAL MEDICINE

## 2023-07-11 PROCEDURE — 3074F PR MOST RECENT SYSTOLIC BLOOD PRESSURE < 130 MM HG: ICD-10-PCS | Mod: CPTII,S$GLB,, | Performed by: INTERNAL MEDICINE

## 2023-07-11 PROCEDURE — 1160F PR REVIEW ALL MEDS BY PRESCRIBER/CLIN PHARMACIST DOCUMENTED: ICD-10-PCS | Mod: CPTII,S$GLB,, | Performed by: INTERNAL MEDICINE

## 2023-07-11 PROCEDURE — 3079F DIAST BP 80-89 MM HG: CPT | Mod: CPTII,S$GLB,, | Performed by: INTERNAL MEDICINE

## 2023-07-11 PROCEDURE — 99214 OFFICE O/P EST MOD 30 MIN: CPT | Mod: S$GLB,,, | Performed by: INTERNAL MEDICINE

## 2023-07-11 PROCEDURE — 3008F BODY MASS INDEX DOCD: CPT | Mod: CPTII,S$GLB,, | Performed by: INTERNAL MEDICINE

## 2023-07-11 PROCEDURE — 1160F RVW MEDS BY RX/DR IN RCRD: CPT | Mod: CPTII,S$GLB,, | Performed by: INTERNAL MEDICINE

## 2023-07-11 PROCEDURE — 3008F PR BODY MASS INDEX (BMI) DOCUMENTED: ICD-10-PCS | Mod: CPTII,S$GLB,, | Performed by: INTERNAL MEDICINE

## 2023-07-11 PROCEDURE — 99214 PR OFFICE/OUTPT VISIT, EST, LEVL IV, 30-39 MIN: ICD-10-PCS | Mod: S$GLB,,, | Performed by: INTERNAL MEDICINE

## 2023-07-11 PROCEDURE — 3079F PR MOST RECENT DIASTOLIC BLOOD PRESSURE 80-89 MM HG: ICD-10-PCS | Mod: CPTII,S$GLB,, | Performed by: INTERNAL MEDICINE

## 2023-07-11 PROCEDURE — 1159F PR MEDICATION LIST DOCUMENTED IN MEDICAL RECORD: ICD-10-PCS | Mod: CPTII,S$GLB,, | Performed by: INTERNAL MEDICINE

## 2023-07-11 PROCEDURE — 1159F MED LIST DOCD IN RCRD: CPT | Mod: CPTII,S$GLB,, | Performed by: INTERNAL MEDICINE

## 2023-07-11 RX ORDER — MINOXIDIL 2.5 MG/1
2.5 TABLET ORAL
COMMUNITY
Start: 2023-03-28 | End: 2023-12-11

## 2023-07-11 RX ORDER — EPINEPHRINE 0.3 MG/.3ML
INJECTION SUBCUTANEOUS
COMMUNITY

## 2023-07-11 RX ORDER — PANTOPRAZOLE SODIUM 40 MG/1
40 TABLET, DELAYED RELEASE ORAL 2 TIMES DAILY
Qty: 180 TABLET | Refills: 3 | Status: SHIPPED | OUTPATIENT
Start: 2023-07-11

## 2023-07-11 RX ORDER — CETIRIZINE HYDROCHLORIDE 10 MG/1
TABLET ORAL
COMMUNITY

## 2023-07-11 RX ORDER — ALPRAZOLAM 0.5 MG/1
TABLET ORAL
COMMUNITY
Start: 2023-04-05

## 2023-07-11 RX ORDER — FOLIC ACID 1 MG/1
1000 TABLET ORAL
COMMUNITY
Start: 2023-06-14 | End: 2024-02-12

## 2023-07-11 RX ORDER — VILAZODONE HYDROCHLORIDE 40 MG/1
40 TABLET ORAL
COMMUNITY
Start: 2023-06-26

## 2023-07-11 RX ORDER — PILOCARPINE HYDROCHLORIDE 5 MG/1
5 TABLET, FILM COATED ORAL
COMMUNITY
Start: 2023-06-14 | End: 2023-12-11

## 2023-07-11 NOTE — PATIENT INSTRUCTIONS
Try to decrease pantoprazole 40mg to once daily. After a couple of weeks, if you do not have any symptoms such as trouble swallowing or reflux, then okay to try every other day dosing.    Please notify my office if you have not been contacted within two weeks after any procedures, submitting any samples (biopsies, blood, stool, urine, etc.) or after any imaging (X-ray, CT, MRI, etc.).

## 2023-07-11 NOTE — PROGRESS NOTES
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 Diagnoses of Other dysphagia, Fatty liver, Hypercontractile esophagus, Esophagogastric junction outflow obstruction, and Screening for colon cancer were also pertinent to this visit.    PCP: Simran Lilly       HPI:  This is a 47 y.o. female who is here for a follow up. Patient with hypercontractile esophagus. She is on pantoprazole 40 mg BID. Denies any heartburn/reflux. She is currently feeling well overall. Denies dysphagia but does occasionally have bolus sensation. Was started on Mounjaro and has lost 20 pounds in 2 months. Having regular daily BM. Tyler rectal bleeding.    She had labs 6/5/2023 with normal LFTs.      Esophageal Manometry 7/2022: Esophagogastric outlet obstruction. Patient has elevated resting lower esophageal sphincter pressure but sufficient evidence of peristalsis. Also had 2 swallows with DIC greater than 8000 consistent with hypercontractile or jackhammer esophagus. May consider CCB    EGD/Colonoscopy 6/21/2022: small HH, small amount of solid partially digested food in gastric lumen. GBx chr inact gitis w/o Hp, EBx nl. IH, otherwise normal colon. Repeat colonoscopy in 10 yr    Outside record reviewed:  [facility] - 2017 MRCP: wnl, fatty liver, no GB. EGD: gitis, reflux eitis, ABx wnl, EBx wnl. HCV FibroSURE F0 A0, ALT 22, GGT 29, Tb 0.2. HCV/HAV neg, HBV sAg/cIgM/sAb neg, AMA/ZEKE/chol/Fe/T/%/ferr/INR nl. 34/32    Review of Systems   Constitutional:  Positive for fatigue. Negative for fever and unexpected weight change.   HENT:  Negative for mouth sores, postnasal drip, sore throat and trouble swallowing.    Eyes:  Positive for eye dryness. Negative for pain and discharge.   Respiratory:  Positive for chest tightness. Negative for apnea, cough, choking, shortness of breath and wheezing.    Cardiovascular:  Negative for chest pain, palpitations and leg swelling.   Gastrointestinal:  Negative for  abdominal distention, abdominal pain, anal bleeding, blood in stool, change in bowel habit, constipation, diarrhea, nausea, rectal pain, vomiting, reflux, fecal incontinence and change in bowel habit.   Genitourinary:  Negative for bladder incontinence, dysuria and hematuria.   Musculoskeletal:  Positive for joint swelling. Negative for arthralgias and back pain.   Integumentary:  Negative for color change and rash.   Allergic/Immunologic: Negative for environmental allergies and food allergies.   Neurological:  Negative for seizures and headaches.   Hematological:  Negative for adenopathy. Bruises/bleeds easily.      Past Medical History:   Diagnosis Date    Abnormal Pap smear of cervix     Anxiety     BMI 45.0-49.9, adult     Depression     Diabetes mellitus     Diabetes mellitus, type 2     Hashimoto's disease     Hypertension     Liver disease     fatty liver    Mild intermittent asthma, uncomplicated     Psoriatic arthritis     Thyroid disease      Past Surgical History:   Procedure Laterality Date    CHOLECYSTECTOMY      cold knife cone      SHOULDER SURGERY Right     TONSILLECTOMY       Family History   Problem Relation Age of Onset    Diabetes Father     Heart disease Father     Hypertension Mother     Heart disease Mother     Heart disease Brother     Diabetes Brother      Social History     Tobacco Use    Smoking status: Never    Smokeless tobacco: Never   Substance Use Topics    Alcohol use: Yes     Alcohol/week: 1.0 standard drink     Types: 1 Glasses of wine per week    Drug use: Never     Review of patient's allergies indicates:   Allergen Reactions    Levofloxacin Other (See Comments)        Medication List with Changes/Refills   Current Medications    ALPRAZOLAM (XANAX) 0.5 MG TABLET    alprazolam 0.5 mg tablet   TAKE 1 TABLET BY MOUTH ONCE DAILY AS NEEDED FOR ANXIETY    CETIRIZINE (ZYRTEC) 10 MG TABLET        EPINEPHRINE (EPIPEN) 0.3 MG/0.3 ML ATIN    epinephrine 0.3 mg/0.3 mL injection,  "auto-injector    FOLIC ACID (FOLVITE) 1 MG TABLET    Take 1,000 mcg by mouth.    HYDROXYCHLOROQUINE (PLAQUENIL) 200 MG TABLET        LEVOTHYROXINE (SYNTHROID) 75 MCG TABLET    Take 75 mcg by mouth.    METFORMIN (GLUCOPHAGE) 500 MG TABLET    Take 500 mg by mouth.    METOPROLOL SUCCINATE (TOPROL-XL) 25 MG 24 HR TABLET    Take 1 tablet (25 mg total) by mouth once daily.    MINOXIDIL (LONITEN) 2.5 MG TABLET    Take 2.5 mg by mouth.    PILOCARPINE (SALAGEN) 5 MG TAB    Take 5 mg by mouth.    SPIRONOLACTONE (ALDACTONE) 100 MG TABLET    Take 100 mg by mouth.    TIRZEPATIDE 12.5 MG/0.5 ML PNIJ    Inject 12.5 mg into the skin every 7 days.    TRELEGY ELLIPTA 100-62.5-25 MCG DSDV    INHALE 1 PUFF ONCE DAILY    VILAZODONE (VIIBRYD) 40 MG TAB TABLET    Take 40 mg by mouth.   Changed and/or Refilled Medications    Modified Medication Previous Medication    PANTOPRAZOLE (PROTONIX) 40 MG TABLET pantoprazole (PROTONIX) 40 MG tablet       Take 1 tablet (40 mg total) by mouth 2 (two) times daily.    Take 1 tablet (40 mg total) by mouth 2 (two) times daily.   Discontinued Medications    OMALIZUMAB (XOLAIR) 150 MG INJECTION    Inject 300 mg into the skin.         Vital Signs:  /83   Pulse 80   Ht 5' 3" (1.6 m)   Wt 94.8 kg (209 lb)   SpO2 98%   BMI 37.02 kg/m²         Physical Exam  Vitals reviewed.   Constitutional:       General: She is awake. She is not in acute distress.     Appearance: Normal appearance. She is well-developed. She is not ill-appearing, toxic-appearing or diaphoretic.   HENT:      Head: Normocephalic and atraumatic.      Nose: Nose normal.      Mouth/Throat:      Mouth: Mucous membranes are moist.      Pharynx: Oropharynx is clear. No oropharyngeal exudate or posterior oropharyngeal erythema.   Eyes:      General: Lids are normal. Gaze aligned appropriately. No scleral icterus.        Right eye: No discharge.         Left eye: No discharge.      Conjunctiva/sclera: Conjunctivae normal.   Neck:      " Trachea: Trachea normal.   Cardiovascular:      Rate and Rhythm: Normal rate and regular rhythm.      Pulses:           Radial pulses are 2+ on the right side and 2+ on the left side.   Pulmonary:      Effort: Pulmonary effort is normal. No respiratory distress.      Breath sounds: No stridor. No wheezing.   Chest:      Chest wall: No tenderness.   Abdominal:      General: Bowel sounds are normal. There is no distension.      Palpations: Abdomen is soft. There is no fluid wave, hepatomegaly or mass.      Tenderness: There is no abdominal tenderness. There is no guarding or rebound.   Musculoskeletal:         General: No tenderness or deformity.      Cervical back: Full passive range of motion without pain and neck supple. No tenderness.      Right lower leg: No edema.      Left lower leg: No edema.   Lymphadenopathy:      Cervical: No cervical adenopathy.   Skin:     General: Skin is warm and dry.      Capillary Refill: Capillary refill takes less than 2 seconds.      Coloration: Skin is not cyanotic, jaundiced or pale.   Neurological:      General: No focal deficit present.      Mental Status: She is alert and oriented to person, place, and time.      Motor: No tremor.   Psychiatric:         Attention and Perception: Attention normal.         Mood and Affect: Mood and affect normal.         Speech: Speech normal.         Behavior: Behavior normal. Behavior is cooperative.          All of the data above and below has been reviewed by myself and any further interpretations will be reflected in the assessment and plan.   The data includes review of external notes, and independent interpretation of lab results, procedures, x-rays, and imaging reports.      Assessment:  Jackhammer esophagus    Other dysphagia    Fatty liver    Hypercontractile esophagus  -     pantoprazole (PROTONIX) 40 MG tablet; Take 1 tablet (40 mg total) by mouth 2 (two) times daily.  Dispense: 180 tablet; Refill: 3    Esophagogastric junction  outflow obstruction  -     pantoprazole (PROTONIX) 40 MG tablet; Take 1 tablet (40 mg total) by mouth 2 (two) times daily.  Dispense: 180 tablet; Refill: 3    Screening for colon cancer  Comments:  average risk, 2022 colon nl    Her dysphagia has mostly resolved with BID PPI. Does still have occasional globus sensation. Has not attempted decreasing PPI. Will try decreasing pantoprazole 40mg to once daily.  Hx of fatty liver- LFTs are currently WNL     Recommendations:    Try to decrease pantoprazole 40mg to once daily. After a couple of weeks, if you do not have any symptoms such as trouble swallowing or reflux, then okay to try every other day dosing.        Risks, benefits, and alternatives of medical management, any associated procedures, and/or treatment discussed with the patient. Patient given opportunity to ask questions and voices understanding. Patient has elected to proceed with the recommended care modalities as discussed.    Instructed patient to notify my office if they have not been contacted within two weeks after any procedures, submitting any samples (biopsies, blood, stool, urine, etc.) or after any imaging (X-ray, CT, MRI, etc.).     Follow up in about 1 year (around 7/11/2024).    Order summary:  No orders of the defined types were placed in this encounter.     This assessment, plan, and documentation was performed in collaboration with Jenifer Duque NP.     This document may have been created using a voice recognition transcribing system. Incorrect words or phrases may have been missed during proofreading. Please interpret accordingly or contact me for clarification.     Gwen Deluca MD

## 2023-09-06 ENCOUNTER — OUTSIDE PLACE OF SERVICE (OUTPATIENT)
Dept: INTERVENTIONAL RADIOLOGY/VASCULAR | Facility: CLINIC | Age: 48
End: 2023-09-06
Payer: COMMERCIAL

## 2023-09-06 PROCEDURE — 19083 BX BREAST 1ST LESION US IMAG: CPT | Mod: LT,,, | Performed by: RADIOLOGY

## 2023-09-06 PROCEDURE — 19083 PR BX BRST, 1ST LESION, US GUIDANCE: ICD-10-PCS | Mod: LT,,, | Performed by: RADIOLOGY

## 2023-09-12 DIAGNOSIS — R92.1 BREAST CALCIFICATION SEEN ON MAMMOGRAM: Primary | ICD-10-CM

## 2023-11-20 ENCOUNTER — OFFICE VISIT (OUTPATIENT)
Dept: OBSTETRICS AND GYNECOLOGY | Facility: CLINIC | Age: 48
End: 2023-11-20
Payer: COMMERCIAL

## 2023-11-20 VITALS
DIASTOLIC BLOOD PRESSURE: 62 MMHG | HEART RATE: 78 BPM | WEIGHT: 196 LBS | SYSTOLIC BLOOD PRESSURE: 111 MMHG | BODY MASS INDEX: 34.72 KG/M2

## 2023-11-20 DIAGNOSIS — N93.9 ABNORMAL UTERINE BLEEDING (AUB): ICD-10-CM

## 2023-11-20 DIAGNOSIS — C50.919 MALIGNANT NEOPLASM OF FEMALE BREAST, UNSPECIFIED ESTROGEN RECEPTOR STATUS, UNSPECIFIED LATERALITY, UNSPECIFIED SITE OF BREAST: Primary | ICD-10-CM

## 2023-11-20 PROCEDURE — 3074F PR MOST RECENT SYSTOLIC BLOOD PRESSURE < 130 MM HG: ICD-10-PCS | Mod: CPTII,S$GLB,, | Performed by: OBSTETRICS & GYNECOLOGY

## 2023-11-20 PROCEDURE — 3074F SYST BP LT 130 MM HG: CPT | Mod: CPTII,S$GLB,, | Performed by: OBSTETRICS & GYNECOLOGY

## 2023-11-20 PROCEDURE — 1159F PR MEDICATION LIST DOCUMENTED IN MEDICAL RECORD: ICD-10-PCS | Mod: CPTII,S$GLB,, | Performed by: OBSTETRICS & GYNECOLOGY

## 2023-11-20 PROCEDURE — 3078F PR MOST RECENT DIASTOLIC BLOOD PRESSURE < 80 MM HG: ICD-10-PCS | Mod: CPTII,S$GLB,, | Performed by: OBSTETRICS & GYNECOLOGY

## 2023-11-20 PROCEDURE — 99214 OFFICE O/P EST MOD 30 MIN: CPT | Mod: S$GLB,,, | Performed by: OBSTETRICS & GYNECOLOGY

## 2023-11-20 PROCEDURE — 3008F BODY MASS INDEX DOCD: CPT | Mod: CPTII,S$GLB,, | Performed by: OBSTETRICS & GYNECOLOGY

## 2023-11-20 PROCEDURE — 3008F PR BODY MASS INDEX (BMI) DOCUMENTED: ICD-10-PCS | Mod: CPTII,S$GLB,, | Performed by: OBSTETRICS & GYNECOLOGY

## 2023-11-20 PROCEDURE — 3078F DIAST BP <80 MM HG: CPT | Mod: CPTII,S$GLB,, | Performed by: OBSTETRICS & GYNECOLOGY

## 2023-11-20 PROCEDURE — 1159F MED LIST DOCD IN RCRD: CPT | Mod: CPTII,S$GLB,, | Performed by: OBSTETRICS & GYNECOLOGY

## 2023-11-20 PROCEDURE — 99214 PR OFFICE/OUTPT VISIT, EST, LEVL IV, 30-39 MIN: ICD-10-PCS | Mod: S$GLB,,, | Performed by: OBSTETRICS & GYNECOLOGY

## 2023-11-20 RX ORDER — LEVOTHYROXINE, LIOTHYRONINE 76; 18 UG/1; UG/1
TABLET ORAL
COMMUNITY
Start: 2023-11-07

## 2023-11-20 RX ORDER — PROGESTERONE 100 MG/1
CAPSULE ORAL
COMMUNITY
Start: 2023-09-27 | End: 2023-11-20

## 2023-11-20 NOTE — PROGRESS NOTES
48 y.o.  presents for surg consult- pt has been diagnosed with hormone + breast CA.  Onc recommends hyst bso.  Patient's last menstrual period was 10/27/2023..    Past Medical History:   Diagnosis Date    Abnormal Pap smear of cervix     Anxiety     BMI 45.0-49.9, adult     Depression     Diabetes mellitus     Diabetes mellitus, type 2     Hashimoto's disease     Hypertension     Liver disease     fatty liver    Mild intermittent asthma, uncomplicated     Psoriatic arthritis     Thyroid disease      Past Surgical History:   Procedure Laterality Date    CHOLECYSTECTOMY      cold knife cone      SHOULDER SURGERY Right     TONSILLECTOMY       Family History   Problem Relation Age of Onset    Diabetes Father     Heart disease Father     Hypertension Mother     Heart disease Mother     Heart disease Brother     Diabetes Brother      Review of patient's allergies indicates:   Allergen Reactions    Levofloxacin Other (See Comments)       Current Outpatient Medications:     NP THYROID 120 mg Tab, TAKE 2 TABLETS BY MOUTH ONCE DAILY ON AN EMPTY STOMACH, Disp: , Rfl:     ALPRAZolam (XANAX) 0.5 MG tablet, alprazolam 0.5 mg tablet  TAKE 1 TABLET BY MOUTH ONCE DAILY AS NEEDED FOR ANXIETY, Disp: , Rfl:     cetirizine (ZYRTEC) 10 MG tablet, , Disp: , Rfl:     EPINEPHrine (EPIPEN) 0.3 mg/0.3 mL AtIn, epinephrine 0.3 mg/0.3 mL injection, auto-injector, Disp: , Rfl:     folic acid (FOLVITE) 1 MG tablet, Take 1,000 mcg by mouth., Disp: , Rfl:     hydrOXYchloroQUINE (PLAQUENIL) 200 mg tablet, , Disp: , Rfl:     levothyroxine (SYNTHROID) 75 MCG tablet, Take 75 mcg by mouth., Disp: , Rfl:     metFORMIN (GLUCOPHAGE) 500 MG tablet, Take 500 mg by mouth., Disp: , Rfl:     metoprolol succinate (TOPROL-XL) 25 MG 24 hr tablet, Take 1 tablet (25 mg total) by mouth once daily., Disp: 30 tablet, Rfl: 5    minoxidiL (LONITEN) 2.5 MG tablet, Take 2.5 mg by mouth., Disp: , Rfl:     pantoprazole (PROTONIX) 40 MG tablet, Take 1 tablet (40 mg  total) by mouth 2 (two) times daily., Disp: 180 tablet, Rfl: 3    pilocarpine (SALAGEN) 5 MG Tab, Take 5 mg by mouth., Disp: , Rfl:     spironolactone (ALDACTONE) 100 MG tablet, Take 100 mg by mouth., Disp: , Rfl:     tirzepatide 12.5 mg/0.5 mL PnIj, Inject 12.5 mg into the skin every 7 days., Disp: 4 pen, Rfl: 1    TRELEGY ELLIPTA 100-62.5-25 mcg DsDv, INHALE 1 PUFF ONCE DAILY, Disp: , Rfl:     vilazodone (VIIBRYD) 40 mg Tab tablet, Take 40 mg by mouth., Disp: , Rfl:   Social History     Socioeconomic History    Marital status:    Tobacco Use    Smoking status: Never    Smokeless tobacco: Never   Substance and Sexual Activity    Alcohol use: Yes     Alcohol/week: 1.0 standard drink of alcohol     Types: 1 Glasses of wine per week    Drug use: Never    Sexual activity: Yes     Partners: Male     Birth control/protection: I.U.D.       Review of Systems   Constitutional:  Negative for chills and fever.   Respiratory:  Negative for shortness of breath.    Cardiovascular:  Negative for chest pain.   Gastrointestinal:  Negative for abdominal pain, blood in stool, constipation, diarrhea, nausea, vomiting and reflux.   Genitourinary:  Positive for menorrhagia. Negative for dysmenorrhea, dyspareunia, dysuria, hematuria, hot flashes, menstrual problem, pelvic pain, vaginal bleeding, vaginal discharge, postcoital bleeding and vaginal dryness.   Musculoskeletal:  Negative for arthralgias and joint swelling.   Integumentary:  Negative for rash and hair changes.   Psychiatric/Behavioral:  Negative for depression. The patient is not nervous/anxious.        Vitals:    11/20/23 1303   BP: 111/62   Pulse: 78       Physical Exam:   Constitutional: She appears well-developed and well-nourished. No distress.    HENT:   Head: Normocephalic.    Eyes: Conjunctivae and EOM are normal.    Neck: No tracheal deviation present. No thyromegaly present.    Cardiovascular:       Exam reveals no clubbing, no cyanosis and no edema.         Pulmonary/Chest: Effort normal. No respiratory distress.                  Musculoskeletal: Normal range of motion and moves all extremeties.        Skin: No rash noted. She is not diaphoretic. No cyanosis. Nails show no clubbing.    Psychiatric: She has a normal mood and affect. Her behavior is normal. Judgment and thought content normal.           Assessment: AUB, breast ca    Plan: tl bso  I have discussed the risks, benefits, indications, and alternatives of the procedure in detail.  The patient verbalizes her understanding.  All questions answered.  Consents signed.  The patient agrees to proceed to proceed as planned.

## 2023-12-11 ENCOUNTER — OFFICE VISIT (OUTPATIENT)
Dept: SURGERY | Facility: CLINIC | Age: 48
End: 2023-12-11
Payer: COMMERCIAL

## 2023-12-11 VITALS — HEIGHT: 63 IN | BODY MASS INDEX: 32.07 KG/M2 | WEIGHT: 181 LBS

## 2023-12-11 DIAGNOSIS — K43.2 VENTRAL INCISIONAL HERNIA WITHOUT OBSTRUCTION OR GANGRENE: Primary | ICD-10-CM

## 2023-12-11 PROCEDURE — 1159F PR MEDICATION LIST DOCUMENTED IN MEDICAL RECORD: ICD-10-PCS | Mod: CPTII,S$GLB,, | Performed by: SURGERY

## 2023-12-11 PROCEDURE — 99213 OFFICE O/P EST LOW 20 MIN: CPT | Mod: S$GLB,,, | Performed by: SURGERY

## 2023-12-11 PROCEDURE — 1159F MED LIST DOCD IN RCRD: CPT | Mod: CPTII,S$GLB,, | Performed by: SURGERY

## 2023-12-11 PROCEDURE — 3008F BODY MASS INDEX DOCD: CPT | Mod: CPTII,S$GLB,, | Performed by: SURGERY

## 2023-12-11 PROCEDURE — 99213 PR OFFICE/OUTPT VISIT, EST, LEVL III, 20-29 MIN: ICD-10-PCS | Mod: S$GLB,,, | Performed by: SURGERY

## 2023-12-11 PROCEDURE — 3008F PR BODY MASS INDEX (BMI) DOCUMENTED: ICD-10-PCS | Mod: CPTII,S$GLB,, | Performed by: SURGERY

## 2023-12-11 PROCEDURE — 1160F PR REVIEW ALL MEDS BY PRESCRIBER/CLIN PHARMACIST DOCUMENTED: ICD-10-PCS | Mod: CPTII,S$GLB,, | Performed by: SURGERY

## 2023-12-11 PROCEDURE — 1160F RVW MEDS BY RX/DR IN RCRD: CPT | Mod: CPTII,S$GLB,, | Performed by: SURGERY

## 2023-12-11 RX ORDER — VALACYCLOVIR HYDROCHLORIDE 1 G/1
2000 TABLET, FILM COATED ORAL 2 TIMES DAILY
COMMUNITY
Start: 2023-12-05

## 2023-12-11 NOTE — PROGRESS NOTES
History & Physical    SUBJECTIVE:     History of Present Illness:    48-year-old female known to me from a previous visit with the right upper quadrant ventral incisional hernia through an old gallbladder incision.  Apparently her surgery was complicated by common duct injury and wound infection problems.  She presents with a bulge in the right upper quadrant for some time.  Since I last saw her she has also had breast cancer surgery having had a partial mastectomy and sentinel node biopsy.  She has planned to undergo a total hysterectomy on January 20th with Dr. Avinash Hernandez.    Chief Complaint   Patient presents with    Hernia     Incisional hernia         Review of patient's allergies indicates:  Review of patient's allergies indicates:   Allergen Reactions    Levofloxacin Other (See Comments)       Current Outpatient Medications on File Prior to Visit   Medication Sig Dispense Refill    valACYclovir (VALTREX) 1000 MG tablet Take 2,000 mg by mouth 2 (two) times daily. FOR 1 DAY      ALPRAZolam (XANAX) 0.5 MG tablet alprazolam 0.5 mg tablet   TAKE 1 TABLET BY MOUTH ONCE DAILY AS NEEDED FOR ANXIETY      cetirizine (ZYRTEC) 10 MG tablet       EPINEPHrine (EPIPEN) 0.3 mg/0.3 mL AtIn epinephrine 0.3 mg/0.3 mL injection, auto-injector      folic acid (FOLVITE) 1 MG tablet Take 1,000 mcg by mouth.      hydrOXYchloroQUINE (PLAQUENIL) 200 mg tablet       metFORMIN (GLUCOPHAGE) 500 MG tablet Take 500 mg by mouth.      metoprolol succinate (TOPROL-XL) 25 MG 24 hr tablet Take 1 tablet (25 mg total) by mouth once daily. 30 tablet 5    NP THYROID 120 mg Tab TAKE 2 TABLETS BY MOUTH ONCE DAILY ON AN EMPTY STOMACH      pantoprazole (PROTONIX) 40 MG tablet Take 1 tablet (40 mg total) by mouth 2 (two) times daily. 180 tablet 3    tirzepatide 12.5 mg/0.5 mL PnIj Inject 12.5 mg into the skin every 7 days. 4 pen 1    TRELEGY ELLIPTA 100-62.5-25 mcg DsDv INHALE 1 PUFF ONCE DAILY      vilazodone (VIIBRYD) 40 mg Tab tablet Take 40 mg by  mouth.      [DISCONTINUED] levothyroxine (SYNTHROID) 75 MCG tablet Take 75 mcg by mouth.      [DISCONTINUED] minoxidiL (LONITEN) 2.5 MG tablet Take 2.5 mg by mouth.      [DISCONTINUED] pilocarpine (SALAGEN) 5 MG Tab Take 5 mg by mouth.      [DISCONTINUED] spironolactone (ALDACTONE) 100 MG tablet Take 100 mg by mouth.       No current facility-administered medications on file prior to visit.       Past Medical History:   Diagnosis Date    Abnormal Pap smear of cervix     Anxiety     BMI 45.0-49.9, adult     Depression     Diabetes mellitus     Diabetes mellitus, type 2     Hashimoto's disease     Hypertension     Liver disease     fatty liver    Mild intermittent asthma, uncomplicated     Psoriatic arthritis     Thyroid disease      Past Surgical History:   Procedure Laterality Date    CHOLECYSTECTOMY      cold knife cone      SHOULDER SURGERY Right     TONSILLECTOMY       Family History   Problem Relation Age of Onset    Diabetes Father     Heart disease Father     Hypertension Mother     Heart disease Mother     Heart disease Brother     Diabetes Brother        Social History     Socioeconomic History    Marital status:    Tobacco Use    Smoking status: Never    Smokeless tobacco: Never   Substance and Sexual Activity    Alcohol use: Yes     Alcohol/week: 1.0 standard drink of alcohol     Types: 1 Glasses of wine per week    Drug use: Never    Sexual activity: Yes     Partners: Male     Birth control/protection: I.U.D.          Review of Systems   Constitutional:  Positive for weight loss.   Respiratory: Negative.     Cardiovascular: Negative.    Gastrointestinal:  Positive for abdominal pain.   Musculoskeletal: Negative.    Endo/Heme/Allergies: Negative.    Psychiatric/Behavioral: Negative.         OBJECTIVE:     There were no vitals filed for this visit.              Physical Exam:  Physical Exam  Constitutional:       Appearance: Normal appearance.   HENT:      Head: Normocephalic.   Eyes:      Pupils:  Pupils are equal, round, and reactive to light.   Cardiovascular:      Rate and Rhythm: Normal rate and regular rhythm.   Pulmonary:      Effort: Pulmonary effort is normal.      Breath sounds: Normal breath sounds.   Abdominal:      General: Abdomen is flat. Bowel sounds are normal.      Palpations: Abdomen is soft.      Hernia: A hernia (Hernia noted lateral extent of right upper quadrant incision, reducible) is present.       Musculoskeletal:         General: Normal range of motion.      Cervical back: Normal range of motion.   Skin:     General: Skin is warm and dry.      Coloration: Skin is not jaundiced.   Neurological:      General: No focal deficit present.      Mental Status: She is alert and oriented to person, place, and time.   Psychiatric:         Mood and Affect: Mood normal.         Behavior: Behavior normal.             ASSESSMENT/PLAN:   Incisional hernia without obstruction or gangrene, nonrecurrent  PLAN:  Were discussed with Dr. Hernandez about coordinating possible procedure at the time that he does his hysterectomy.  Certainly do not want any risk of cross contamination or infection especially if mesh will be placed.  I will call patient back after I discuss this with Dr. Hernandez

## 2023-12-14 LAB
ANION GAP SERPL CALC-SCNC: 10 MMOL/L (ref 3–11)
APPEARANCE, UA: CLEAR
BASOPHILS NFR BLD: 0.5 % (ref 0–3)
BILIRUB UR QL STRIP: NEGATIVE MG/DL
BUN SERPL-MCNC: 9 MG/DL (ref 7–18)
BUN/CREAT SERPL: 16.66 RATIO (ref 7–18)
CALCIUM SERPL-MCNC: 9.6 MG/DL (ref 8.8–10.5)
CHLORIDE SERPL-SCNC: 102 MMOL/L (ref 100–108)
CO2 SERPL-SCNC: 24 MMOL/L (ref 21–32)
COLOR UR: ABNORMAL
CREAT SERPL-MCNC: 0.54 MG/DL (ref 0.55–1.02)
EOSINOPHIL NFR BLD: 1.5 % (ref 1–3)
ERYTHROCYTE [DISTWIDTH] IN BLOOD BY AUTOMATED COUNT: 12.7 % (ref 12.5–18)
GFR ESTIMATION: > 60
GLUCOSE (UA): NORMAL MG/DL
GLUCOSE SERPL-MCNC: 75 MG/DL (ref 70–110)
HCG QUALITATIVE: NEGATIVE
HCT VFR BLD AUTO: 38.5 % (ref 37–47)
HGB BLD-MCNC: 12.6 G/DL (ref 12–16)
HGB UR QL STRIP: NEGATIVE /UL
KETONES UR QL STRIP: NEGATIVE MG/DL
LEUKOCYTE ESTERASE UR QL STRIP: NEGATIVE /UL
LYMPHOCYTES NFR BLD: 37.2 % (ref 25–40)
MCH RBC QN AUTO: 29.9 PG (ref 27–31.2)
MCHC RBC AUTO-ENTMCNC: 32.7 G/DL (ref 31.8–35.4)
MCV RBC AUTO: 91.4 FL (ref 80–97)
MONOCYTES NFR BLD: 6.7 % (ref 1–15)
NEUTROPHILS # BLD AUTO: 3.2 10*3/UL (ref 1.8–7.7)
NEUTROPHILS NFR BLD: 53.8 % (ref 37–80)
NITRITE UR QL STRIP: NEGATIVE
NUCLEATED RED BLOOD CELLS: 0 %
PH UR STRIP: 6 PH (ref 5–9)
PLATELETS: 250 10*3/UL (ref 142–424)
POTASSIUM SERPL-SCNC: 4 MMOL/L (ref 3.6–5.2)
PROT UR QL STRIP: NEGATIVE MG/DL
RBC # BLD AUTO: 4.21 10*6/UL (ref 4.2–5.4)
SODIUM BLD-SCNC: 136 MMOL/L (ref 135–145)
SP GR UR STRIP: 1.01 (ref 1–1.03)
SPECIMEN COLLECTION METHOD, URINE: ABNORMAL
UROBILINOGEN UR STRIP-ACNC: NORMAL MG/DL
WBC # BLD: 6 10*3/UL (ref 4.6–10.2)

## 2023-12-16 ENCOUNTER — PATIENT MESSAGE (OUTPATIENT)
Dept: SURGERY | Facility: CLINIC | Age: 48
End: 2023-12-16
Payer: COMMERCIAL

## 2023-12-19 ENCOUNTER — PATIENT MESSAGE (OUTPATIENT)
Dept: OBSTETRICS AND GYNECOLOGY | Facility: CLINIC | Age: 48
End: 2023-12-19
Payer: COMMERCIAL

## 2023-12-20 ENCOUNTER — OUTSIDE PLACE OF SERVICE (OUTPATIENT)
Dept: OBSTETRICS AND GYNECOLOGY | Facility: CLINIC | Age: 48
End: 2023-12-20
Payer: COMMERCIAL

## 2023-12-20 ENCOUNTER — OUTSIDE PLACE OF SERVICE (OUTPATIENT)
Dept: OBSTETRICS AND GYNECOLOGY | Facility: CLINIC | Age: 48
End: 2023-12-20

## 2023-12-20 DIAGNOSIS — Z90.710 STATUS POST HYSTERECTOMY: Primary | ICD-10-CM

## 2023-12-20 LAB
GLUCOSE SERPL-MCNC: 80 MG/DL (ref 70–105)
GLUCOSE SERPL-MCNC: 92 MG/DL (ref 70–105)

## 2023-12-20 PROCEDURE — 58571 TLH W/T/O 250 G OR LESS: CPT | Mod: 80,,, | Performed by: STUDENT IN AN ORGANIZED HEALTH CARE EDUCATION/TRAINING PROGRAM

## 2023-12-20 PROCEDURE — 58571 TLH W/T/O 250 G OR LESS: CPT | Mod: ,,, | Performed by: OBSTETRICS & GYNECOLOGY

## 2023-12-20 PROCEDURE — 58571 PR LAPAROSCOPY W TOT HYSTERECTUTERUS <=250 GRAM  W TUBE/OVARY: ICD-10-PCS | Mod: ,,, | Performed by: OBSTETRICS & GYNECOLOGY

## 2023-12-20 PROCEDURE — 58571 PR LAPAROSCOPY W TOT HYSTERECTUTERUS <=250 GRAM  W TUBE/OVARY: ICD-10-PCS | Mod: 80,,, | Performed by: STUDENT IN AN ORGANIZED HEALTH CARE EDUCATION/TRAINING PROGRAM

## 2023-12-20 RX ORDER — OXYCODONE AND ACETAMINOPHEN 5; 325 MG/1; MG/1
1 TABLET ORAL EVERY 4 HOURS PRN
Qty: 28 EACH | Refills: 0 | Status: SHIPPED | OUTPATIENT
Start: 2023-12-20 | End: 2024-02-12

## 2023-12-27 ENCOUNTER — PATIENT MESSAGE (OUTPATIENT)
Dept: OBSTETRICS AND GYNECOLOGY | Facility: CLINIC | Age: 48
End: 2023-12-27
Payer: COMMERCIAL

## 2024-01-02 ENCOUNTER — OFFICE VISIT (OUTPATIENT)
Dept: OBSTETRICS AND GYNECOLOGY | Facility: CLINIC | Age: 49
End: 2024-01-02
Payer: COMMERCIAL

## 2024-01-02 VITALS
HEIGHT: 63 IN | SYSTOLIC BLOOD PRESSURE: 119 MMHG | HEART RATE: 76 BPM | WEIGHT: 183.13 LBS | DIASTOLIC BLOOD PRESSURE: 79 MMHG | BODY MASS INDEX: 32.45 KG/M2

## 2024-01-02 DIAGNOSIS — Z09 POSTOP CHECK: Primary | ICD-10-CM

## 2024-01-02 DIAGNOSIS — R30.0 DYSURIA: ICD-10-CM

## 2024-01-02 DIAGNOSIS — R31.9 HEMATURIA, UNSPECIFIED TYPE: ICD-10-CM

## 2024-01-02 LAB
AMORPH URATE CRY URNS QL MICRO: ABNORMAL
BACTERIA #/AREA URNS HPF: ABNORMAL /[HPF]
BILIRUB UR QL STRIP: NEGATIVE
CLARITY UR: ABNORMAL
COLOR UR: YELLOW
EPITHELIAL CELLS: ABNORMAL
GLUCOSE (UA): NEGATIVE MG/DL
KETONES UR QL STRIP: NEGATIVE MG/DL
LEUKOCYTE ESTERASE UR QL STRIP: NEGATIVE
MUCOUS THREADS URNS QL MICRO: ABNORMAL
NITRITE UR QL STRIP: NEGATIVE
OCCULT BLOOD: ABNORMAL
PH, URINE: 7 (ref 5–7.5)
PROT UR QL STRIP: 25 MG/DL
RBC/HPF: ABNORMAL
SP GR UR STRIP: 1.01 (ref 1–1.03)
UROBILINOGEN, URINE: NORMAL E.U./DL (ref 0–1)
WBC/HPF: ABNORMAL

## 2024-01-02 PROCEDURE — 1159F MED LIST DOCD IN RCRD: CPT | Mod: CPTII,S$GLB,, | Performed by: NURSE PRACTITIONER

## 2024-01-02 PROCEDURE — 3078F DIAST BP <80 MM HG: CPT | Mod: CPTII,S$GLB,, | Performed by: NURSE PRACTITIONER

## 2024-01-02 PROCEDURE — 3074F SYST BP LT 130 MM HG: CPT | Mod: CPTII,S$GLB,, | Performed by: NURSE PRACTITIONER

## 2024-01-02 PROCEDURE — 99024 POSTOP FOLLOW-UP VISIT: CPT | Mod: S$GLB,,, | Performed by: NURSE PRACTITIONER

## 2024-01-02 NOTE — PROGRESS NOTES
Subjective:       Patient ID: Karey Brown is a 48 y.o. female.    Chief Complaint:  Post-op Evaluation        History of Present Illness  Presents for postop exam sp TLH/BSO/TISHA  Complaints dysuria and some constipation.  No issues with vaginal bleeding.     Review of Systems  Review of Systems   Constitutional:  Negative for activity change, appetite change, chills, diaphoresis and fever.   Eyes:  Negative for visual disturbance.   Respiratory:  Negative for shortness of breath and wheezing.    Cardiovascular:  Negative for chest pain.   Gastrointestinal:  Negative for blood in stool, constipation, diarrhea, nausea and vomiting.   Genitourinary:  Negative for dysuria, hematuria and menorrhagia.   Integumentary:  Negative for breast skin changes and breast tenderness.   Neurological:  Negative for headaches.   Psychiatric/Behavioral:  Negative for depression. The patient is not nervous/anxious.    Breast: Negative for lump, skin changes and tenderness         Objective:   Physical Exam:   Constitutional: She appears well-developed and well-nourished. No distress.    HENT:   Head: Normocephalic and atraumatic.      Cardiovascular:  Normal rate.      Exam reveals no clubbing and no cyanosis.        Pulmonary/Chest: Effort normal. No respiratory distress.        Abdominal: Soft. She exhibits abdominal incision. She exhibits no distension. There is no rebound and no guarding.   Incisions clean, dry, intact, and without erythema.               Musculoskeletal: Normal range of motion and moves all extremeties.        Skin: Skin is warm and dry. She is not diaphoretic. No cyanosis or erythema. Nails show no clubbing.    Psychiatric: She has a normal mood and affect. Her behavior is normal.       Urine Dip  + trace blood on urine dip     Pathology   Cervix, uterus, bilateral tubes and ovaries:  Adenomyosis  Benign ectocervix and endocervix  Left ovary-corpus albicans  Right ovary- hemorrhagic corpus luteum and  surface fibrous adhesions.  Left and right fallopian tubes with no histopathological abnormality.  Intrauterine device        Assessment:     1. Postop check    2. Dysuria    3. Hematuria, unspecified type         Plan:     Postop check    Dysuria  -     Urinalysis  -     Urine Culture High Risk    Hematuria, unspecified type  -     Urinalysis  -     Urine Culture High Risk         Routine post op care     Follow up in about 4 weeks (around 1/30/2024) for w/Dr Hernandez.

## 2024-01-03 ENCOUNTER — TELEPHONE (OUTPATIENT)
Dept: OBSTETRICS AND GYNECOLOGY | Facility: CLINIC | Age: 49
End: 2024-01-03
Payer: COMMERCIAL

## 2024-01-03 ENCOUNTER — PATIENT MESSAGE (OUTPATIENT)
Dept: OBSTETRICS AND GYNECOLOGY | Facility: CLINIC | Age: 49
End: 2024-01-03
Payer: COMMERCIAL

## 2024-01-03 NOTE — TELEPHONE ENCOUNTER
Patient reports bowel movement early this morning s/p taking a stool softener. States she had a small amount of bleeding, but her cramping has resolved. Instructed to rest today and monitor bleeding.

## 2024-01-04 ENCOUNTER — PATIENT MESSAGE (OUTPATIENT)
Dept: OBSTETRICS AND GYNECOLOGY | Facility: CLINIC | Age: 49
End: 2024-01-04
Payer: COMMERCIAL

## 2024-01-04 LAB — URINE CULTURE, ROUTINE: NORMAL

## 2024-01-17 ENCOUNTER — PATIENT MESSAGE (OUTPATIENT)
Dept: OBSTETRICS AND GYNECOLOGY | Facility: CLINIC | Age: 49
End: 2024-01-17

## 2024-01-17 ENCOUNTER — OFFICE VISIT (OUTPATIENT)
Dept: OBSTETRICS AND GYNECOLOGY | Facility: CLINIC | Age: 49
End: 2024-01-17
Payer: COMMERCIAL

## 2024-01-17 VITALS
HEIGHT: 63 IN | WEIGHT: 183 LBS | HEART RATE: 88 BPM | BODY MASS INDEX: 32.43 KG/M2 | SYSTOLIC BLOOD PRESSURE: 94 MMHG | DIASTOLIC BLOOD PRESSURE: 70 MMHG

## 2024-01-17 DIAGNOSIS — N76.0 BV (BACTERIAL VAGINOSIS): ICD-10-CM

## 2024-01-17 DIAGNOSIS — Z09 FOLLOW-UP EXAMINATION, FOLLOWING OTHER SURGERY: Primary | ICD-10-CM

## 2024-01-17 DIAGNOSIS — B96.89 BV (BACTERIAL VAGINOSIS): ICD-10-CM

## 2024-01-17 PROCEDURE — 3074F SYST BP LT 130 MM HG: CPT | Mod: CPTII,S$GLB,, | Performed by: NURSE PRACTITIONER

## 2024-01-17 PROCEDURE — 3078F DIAST BP <80 MM HG: CPT | Mod: CPTII,S$GLB,, | Performed by: NURSE PRACTITIONER

## 2024-01-17 PROCEDURE — 1159F MED LIST DOCD IN RCRD: CPT | Mod: CPTII,S$GLB,, | Performed by: NURSE PRACTITIONER

## 2024-01-17 PROCEDURE — 99024 POSTOP FOLLOW-UP VISIT: CPT | Mod: S$GLB,,, | Performed by: NURSE PRACTITIONER

## 2024-01-17 RX ORDER — METRONIDAZOLE 500 MG/1
500 TABLET ORAL 2 TIMES DAILY
Qty: 14 TABLET | Refills: 0 | Status: SHIPPED | OUTPATIENT
Start: 2024-01-17 | End: 2024-01-24

## 2024-01-17 RX ORDER — ERGOCALCIFEROL 1.25 MG/1
1 CAPSULE ORAL
COMMUNITY

## 2024-01-17 RX ORDER — OMALIZUMAB 150 MG/ML
1 INJECTION, SOLUTION SUBCUTANEOUS
COMMUNITY
Start: 2023-07-06

## 2024-01-17 NOTE — PROGRESS NOTES
Subjective:       Patient ID: Karey Brown is a 48 y.o. female.    Chief Complaint:  Post-op Evaluation        History of Present Illness  Presents for postop exam [facility]  on 12/20/23  Complains of irritation to naval area, occasional vaginal bleeding with activity, and vaginal odor.   No issues with urination or bowel movement.     Review of Systems  Review of Systems   Constitutional:  Negative for activity change, appetite change, chills, diaphoresis and fever.   Eyes:  Negative for visual disturbance.   Respiratory:  Negative for shortness of breath and wheezing.    Cardiovascular:  Negative for chest pain.   Gastrointestinal:  Negative for blood in stool, constipation, diarrhea, nausea and vomiting.   Genitourinary:  Positive for vaginal bleeding. Negative for dysuria, hematuria and menorrhagia.   Integumentary:  Negative for breast skin changes and breast tenderness.   Neurological:  Negative for headaches.   Psychiatric/Behavioral:  Negative for depression. The patient is not nervous/anxious.    Breast: Negative for lump, skin changes and tenderness         Objective:   Physical Exam:   Constitutional: She appears well-developed and well-nourished. No distress.    HENT:   Head: Normocephalic and atraumatic.      Cardiovascular:       Exam reveals no clubbing and no cyanosis.        Pulmonary/Chest: Effort normal. No respiratory distress.        Abdominal: Soft. She exhibits abdominal incision. She exhibits no distension. There is no rebound and no guarding.   Incisions clean, dry, intact, and without erythema.  Granulation tissue to anterior umbilicus. Suture clipped per pt request         Genitourinary: The external female genitalia was normal.      Genitourinary Comments: Suture intact. Healing well   Small area of friability. Silver nitrate to area of bleeding. Pt tolerated well.              Musculoskeletal: Normal range of motion and moves all extremeties.        Skin: Skin is warm and dry. She is  not diaphoretic. No cyanosis or erythema. Nails show no clubbing.    Psychiatric: She has a normal mood and affect. Her behavior is normal.      Slide examined under the microscope and there were per HPF  Clue cells +many   Hyphae-none  Trichomonas-none   WBC-0-5     Assessment:     1. Follow-up examination, following other surgery    2. BV (bacterial vaginosis)         Plan:     Follow-up examination, following other surgery    BV (bacterial vaginosis)  -     metroNIDAZOLE (FLAGYL) 500 MG tablet; Take 1 tablet (500 mg total) by mouth 2 (two) times a day. for 7 days  Dispense: 14 tablet; Refill: 0         Continue with current restrictions  Call for continued issues     Follow up for KA with Dr Hernandez .

## 2024-01-30 ENCOUNTER — OFFICE VISIT (OUTPATIENT)
Dept: OBSTETRICS AND GYNECOLOGY | Facility: CLINIC | Age: 49
End: 2024-01-30
Payer: COMMERCIAL

## 2024-01-30 VITALS
HEART RATE: 67 BPM | HEIGHT: 63 IN | SYSTOLIC BLOOD PRESSURE: 94 MMHG | BODY MASS INDEX: 32.07 KG/M2 | DIASTOLIC BLOOD PRESSURE: 67 MMHG | WEIGHT: 181 LBS

## 2024-01-30 DIAGNOSIS — Z09 POSTOP CHECK: Primary | ICD-10-CM

## 2024-01-30 PROCEDURE — 99024 POSTOP FOLLOW-UP VISIT: CPT | Mod: S$GLB,,, | Performed by: OBSTETRICS & GYNECOLOGY

## 2024-01-30 PROCEDURE — 3008F BODY MASS INDEX DOCD: CPT | Mod: CPTII,S$GLB,, | Performed by: OBSTETRICS & GYNECOLOGY

## 2024-01-30 PROCEDURE — 3074F SYST BP LT 130 MM HG: CPT | Mod: CPTII,S$GLB,, | Performed by: OBSTETRICS & GYNECOLOGY

## 2024-01-30 PROCEDURE — 3078F DIAST BP <80 MM HG: CPT | Mod: CPTII,S$GLB,, | Performed by: OBSTETRICS & GYNECOLOGY

## 2024-01-30 RX ORDER — BUSPIRONE HYDROCHLORIDE 10 MG/1
1 TABLET ORAL 2 TIMES DAILY
COMMUNITY
End: 2024-02-12

## 2024-02-02 ENCOUNTER — TELEPHONE (OUTPATIENT)
Dept: GASTROENTEROLOGY | Facility: CLINIC | Age: 49
End: 2024-02-02
Payer: COMMERCIAL

## 2024-02-02 DIAGNOSIS — K22.4 JACKHAMMER ESOPHAGUS: ICD-10-CM

## 2024-02-02 DIAGNOSIS — R13.19 OTHER DYSPHAGIA: Primary | ICD-10-CM

## 2024-02-02 NOTE — TELEPHONE ENCOUNTER
Pat came to window. Pat is having issues due to her radiation. Pat cannot eat or drink anything, she staying dizzy, and she is having some nausea. She stated she recently was given iv's to her being dehydrated. Please advise when to jona her. 2/2/24 LRA

## 2024-02-02 NOTE — TELEPHONE ENCOUNTER
We first need to make sure that her oncologist is aware of her symptoms. Per our conversation, she was feeling as though she couldn't eat because of trouble swallowing correct? If symptoms are severe and she can not swallow anything including water she needs to present to ER. Would also recommend increasing pantoprazole back to twice daily in the mean time.    I will discuss with Dr. Deluca about plan of care and appointment.    [facility] reviewed.  Patient with Breast Ca- currently on radiation, hashimotos thyroiditis  ER visit due to N/V dysphagia 1/27/2024:  Neck US :heterogenous thyroid.  WBC 3.0L CBC, CMP, Lipase-NL

## 2024-02-05 ENCOUNTER — PATIENT MESSAGE (OUTPATIENT)
Dept: GASTROENTEROLOGY | Facility: CLINIC | Age: 49
End: 2024-02-05
Payer: COMMERCIAL

## 2024-02-05 ENCOUNTER — TELEPHONE (OUTPATIENT)
Dept: GASTROENTEROLOGY | Facility: CLINIC | Age: 49
End: 2024-02-05
Payer: COMMERCIAL

## 2024-02-05 NOTE — TELEPHONE ENCOUNTER
----- Message from Nelson Pierce sent at 2/5/2024  9:45 AM CST -----  Contact: Pt  Type:  Patient Returning Call    Who Called: pt   Who Left Message for Patient: nurse  Does the patient know what this is regarding?: an appt   Would the patient rather a call back or a response via [facility]?  Phone and pt portal   Best Call Back Number: [phone]  Additional Information:

## 2024-02-05 NOTE — TELEPHONE ENCOUNTER
Patient called stating she was diagnosed with Breast Cancer in Sept 2023. Patient states she had lumpectomy in Oct 2023. Patient is now undergoing radiation and states that her last radiation treatment will be Feb 12th, however, patient states she is having tenderness in her upper stomach, trouble swallowing, and nauseated. Patient states Dr. Zuleta states he would call out some magic mouthwash for patient to try and help with swallowing. Radiation could possibly be inflaming esophagus. Patient presented to ER at [facility] a week ago and was told to follow up with Dr. Deluca. Please advise.   [facility] Phone Number [phone]  [facility] Medication Refill Request Information:  * Please contact your pharmacy regarding ANY request for medication refills.  ** [facility] Prescription Fax = [phone]  * Please allow 3 business days for routine medication refills.  * Please allow 5 business days for controlled substance medication refills.     [facility] Test Result notification information:  *You will be notified with in 7-10 days of your appointment day regarding the results of your test.  If you are on MyChart you will be notified as soon as the provider has reviewed the results and signed off on them.

## 2024-02-06 ENCOUNTER — TELEPHONE (OUTPATIENT)
Dept: GASTROENTEROLOGY | Facility: CLINIC | Age: 49
End: 2024-02-06
Payer: COMMERCIAL

## 2024-02-06 NOTE — TELEPHONE ENCOUNTER
Got in touch with patient and she has not been in touch with her oncologist. She hasn't seen him since beginning of December. She did have a apt this month but the office cancelled it and now she is jamil for in March. She stated she has increased the pant. To once a day. She will now change it to twice a day as advised. Since she increased it to once a day she is now able to eat but it still hurts to swallow. 2/6/24 LRA

## 2024-02-06 NOTE — TELEPHONE ENCOUNTER
----- Message from Inna Andrade sent at 2/5/2024  4:14 PM CST -----  Contact: self  Type:  Patient Returning Call    Who Called:Karey Brown  Who Left Message for Patient:unsure  Does the patient know what this is regarding?:upcoming appt/sooner appt  Would the patient rather a call back or a response via [facility]?   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

## 2024-02-06 NOTE — TELEPHONE ENCOUNTER
Returned pt call and reiterated with BERTHA stated on 2/2/24 note, that BERTHA will discuss a plan of care and appt with NBP and to increase panto back to twice daily in the mean time. I asked the pt if she was able to swallow water? Pt stated at the present time she is able to swallow water and eat, but did does take some time; food does get stuck. PT also mentioned that two weeks ago she was in the ER and had two bags of fluids. I told her if her symptoms do progress to go to the ER again.  Pt acknowledge that she understood and appreciated that we are keeping her in the loop. mathieu

## 2024-02-07 NOTE — TELEPHONE ENCOUNTER
Will order esophagram and MBSS.  Make next available OV with NP to discuss potential of repeat EGD and/or initiating low dose CCB.  Another option is empiric esophageal dilation.      Notify patient of orders signed and schedule next available NP OV.  ZULMA

## 2024-02-12 ENCOUNTER — OFFICE VISIT (OUTPATIENT)
Dept: GASTROENTEROLOGY | Facility: CLINIC | Age: 49
End: 2024-02-12
Payer: COMMERCIAL

## 2024-02-12 VITALS
HEART RATE: 78 BPM | HEIGHT: 63 IN | OXYGEN SATURATION: 98 % | BODY MASS INDEX: 33.28 KG/M2 | DIASTOLIC BLOOD PRESSURE: 68 MMHG | SYSTOLIC BLOOD PRESSURE: 101 MMHG | WEIGHT: 187.81 LBS

## 2024-02-12 DIAGNOSIS — R10.13 EPIGASTRIC PAIN: ICD-10-CM

## 2024-02-12 DIAGNOSIS — K22.4 JACKHAMMER ESOPHAGUS: ICD-10-CM

## 2024-02-12 DIAGNOSIS — K21.9 GASTROESOPHAGEAL REFLUX DISEASE, UNSPECIFIED WHETHER ESOPHAGITIS PRESENT: ICD-10-CM

## 2024-02-12 DIAGNOSIS — R10.11 RIGHT UPPER QUADRANT PAIN: ICD-10-CM

## 2024-02-12 DIAGNOSIS — R13.19 OTHER DYSPHAGIA: Primary | ICD-10-CM

## 2024-02-12 PROCEDURE — 1160F RVW MEDS BY RX/DR IN RCRD: CPT | Mod: CPTII,S$GLB,,

## 2024-02-12 PROCEDURE — 3074F SYST BP LT 130 MM HG: CPT | Mod: CPTII,S$GLB,,

## 2024-02-12 PROCEDURE — 1159F MED LIST DOCD IN RCRD: CPT | Mod: CPTII,S$GLB,,

## 2024-02-12 PROCEDURE — 3078F DIAST BP <80 MM HG: CPT | Mod: CPTII,S$GLB,,

## 2024-02-12 PROCEDURE — 99215 OFFICE O/P EST HI 40 MIN: CPT | Mod: S$GLB,,,

## 2024-02-12 PROCEDURE — 3008F BODY MASS INDEX DOCD: CPT | Mod: CPTII,S$GLB,,

## 2024-02-12 RX ORDER — SUCRALFATE 1 G/10ML
1 SUSPENSION ORAL
Qty: 414 ML | Refills: 0 | Status: SHIPPED | OUTPATIENT
Start: 2024-02-12

## 2024-02-12 RX ORDER — SUCRALFATE 1 G/1
1 TABLET ORAL
Qty: 40 TABLET | Refills: 0 | Status: SHIPPED | OUTPATIENT
Start: 2024-02-12 | End: 2024-02-22

## 2024-02-12 NOTE — PATIENT INSTRUCTIONS
Continue pantoprazole 40 mg twice a day.   May try Carafate 4 times a day as needed, before meals and at bedtime.   Schedule swallow study.

## 2024-02-14 ENCOUNTER — TELEPHONE (OUTPATIENT)
Dept: GASTROENTEROLOGY | Facility: CLINIC | Age: 49
End: 2024-02-14
Payer: COMMERCIAL

## 2024-02-14 DIAGNOSIS — K21.9 GASTROESOPHAGEAL REFLUX DISEASE, UNSPECIFIED WHETHER ESOPHAGITIS PRESENT: ICD-10-CM

## 2024-02-14 DIAGNOSIS — K22.4 JACKHAMMER ESOPHAGUS: ICD-10-CM

## 2024-02-14 DIAGNOSIS — R10.13 EPIGASTRIC PAIN: ICD-10-CM

## 2024-02-14 DIAGNOSIS — R13.19 OTHER DYSPHAGIA: Primary | ICD-10-CM

## 2024-02-14 NOTE — TELEPHONE ENCOUNTER
Patient added to procedure schedule for egd on 2/15/24 per NBP. H+P, last office visit, and order faxed to [facility] - dmp

## 2024-02-14 NOTE — TELEPHONE ENCOUNTER
"[facility]  [address] [address]  Phone: [phone]  Fax: [phone]    History & Physical         Provider: Dr. Gwen Deluca    Patient Name: Karey LIN (age):1975  48 y.o.           Gender: female   Phone: [phone]     Referring Physician: Genevieve Borges     Vital Signs:   Height - 5' 3"  Weight - 187 lb  BMI -  33.27    Plan: EGD @ [facility]    Encounter Diagnoses   Name Primary?    Other dysphagia Yes    Gastroesophageal reflux disease, unspecified whether esophagitis present     Jackhammer esophagus     Epigastric pain            History:      Past Medical History:   Diagnosis Date    Abnormal Pap smear of cervix     Anxiety     Breast cancer 2023    Depression     Diabetes mellitus     Fatty liver     Hashimoto's disease     Hypercontractile esophagus     Hypertension     Mild intermittent asthma, uncomplicated     Obesity, Class I, BMI 30.0-34.9 (see actual BMI)     Psoriatic arthritis     Thyroid disease       Past Surgical History:   Procedure Laterality Date    BREAST LUMPECTOMY Left 10/03/2023    CHOLECYSTECTOMY      cold knife cone      COLONOSCOPY      EGD - EXTERNAL RESULT      SHOULDER SURGERY Right     TONSILLECTOMY      TOTAL ABDOMINAL HYSTERECTOMY W/ BILATERAL SALPINGOOPHORECTOMY  2023      Medication List with Changes/Refills   Current Medications    ALPRAZOLAM (XANAX) 0.5 MG TABLET    alprazolam 0.5 mg tablet   TAKE 1 TABLET BY MOUTH ONCE DAILY AS NEEDED FOR ANXIETY    CETIRIZINE (ZYRTEC) 10 MG TABLET        EPINEPHRINE (EPIPEN) 0.3 MG/0.3 ML ATIN    epinephrine 0.3 mg/0.3 mL injection, auto-injector    ERGOCALCIFEROL (ERGOCALCIFEROL) 50,000 UNIT CAP    Take 1 capsule by mouth every 7 days.    HYDROXYCHLOROQUINE (PLAQUENIL) 200 MG TABLET        METFORMIN (GLUCOPHAGE) 500 MG TABLET    Take 500 mg by mouth.    METOPROLOL SUCCINATE (TOPROL-XL) 25 MG " 24 HR TABLET    Take 1 tablet (25 mg total) by mouth once daily.    NP THYROID 120 MG TAB    TAKE 2 TABLETS BY MOUTH ONCE DAILY ON AN EMPTY STOMACH    OMALIZUMAB (XOLAIR) 150 MG/ML INJECTION    Inject 1 mg into the skin every 28 days.    PANTOPRAZOLE (PROTONIX) 40 MG TABLET    Take 1 tablet (40 mg total) by mouth 2 (two) times daily.    SUCRALFATE (CARAFATE) 1 GRAM TABLET    Take 1 tablet (1 g total) by mouth 4 (four) times daily before meals and nightly. for 10 days    SUCRALFATE (CARAFATE) 100 MG/ML SUSPENSION    Take 10 mLs (1 g total) by mouth 4 (four) times daily before meals and nightly.    TIRZEPATIDE 12.5 MG/0.5 ML PNIJ    Inject 12.5 mg into the skin every 7 days.    TRELEGY ELLIPTA 100-62.5-25 MCG DSDV    INHALE 1 PUFF ONCE DAILY    VALACYCLOVIR (VALTREX) 1000 MG TABLET    Take 2,000 mg by mouth 2 (two) times daily. FOR 1 DAY    VILAZODONE (VIIBRYD) 40 MG TAB TABLET    Take 40 mg by mouth.      Review of patient's allergies indicates:   Allergen Reactions    Levofloxacin Other (See Comments)      Family History   Problem Relation Age of Onset    Hypertension Mother     Heart disease Mother     Diabetes Father     Heart disease Father     Heart disease Brother     Diabetes Brother     Colon polyps Neg Hx     Colon cancer Neg Hx     Crohn's disease Neg Hx     Esophageal cancer Neg Hx     Irritable bowel syndrome Neg Hx     Rectal cancer Neg Hx     Stomach cancer Neg Hx     Ulcerative colitis Neg Hx       Social History     Tobacco Use    Smoking status: Never    Smokeless tobacco: Never   Substance Use Topics    Alcohol use: Yes     Alcohol/week: 1.0 standard drink of alcohol     Types: 1 Glasses of wine per week    Drug use: Never        Physical Examination:     General Appearance:___________________________  HEENT:  _____________________________________  Abdomen:____________________________________  Heart:________________________________________  Lungs:_______________________________________  Extremities:___________________________________  Skin:_________________________________________  Endocrine:____________________________________  Genitourinary:_________________________________  Neurological:__________________________________      Patient has been evaluated immediately prior to sedation and is medically cleared for endoscopy with IVCS as an ASA class: ______      Physician Signature: _________________________       Date: ________  Time: ________

## 2024-02-15 NOTE — TELEPHONE ENCOUNTER
Rec'd incoming call from Griselda with [facility] inquiring about a PA. I told her that Cynthia is out and I will f/u with Marita on this matter and call her back at [phone]. I called Marita and told he the situation and Marita is going to handle it. I called Griselda back to told her the same thing. She mentioned that the pt is schedule to have a procedure at 10 am. Brett      Pt end up here at the clinic and wanted to see if she could r/s to Monday. I told her that I would have to check with NBP, since she's booked up. I called and s/w Jodie in the GI lab and she asked NBP. NBP stated that would be fine. I have r/s to 2/19/24 and faxed over updated order to [facility]. mathieu

## 2024-02-19 ENCOUNTER — OUTSIDE PLACE OF SERVICE (OUTPATIENT)
Dept: GASTROENTEROLOGY | Facility: CLINIC | Age: 49
End: 2024-02-19

## 2024-02-19 PROCEDURE — 43235 EGD DIAGNOSTIC BRUSH WASH: CPT | Mod: 52,,, | Performed by: INTERNAL MEDICINE

## 2024-03-13 ENCOUNTER — PATIENT MESSAGE (OUTPATIENT)
Dept: OBSTETRICS AND GYNECOLOGY | Facility: CLINIC | Age: 49
End: 2024-03-13
Payer: COMMERCIAL

## 2024-03-13 ENCOUNTER — TELEPHONE (OUTPATIENT)
Dept: OBSTETRICS AND GYNECOLOGY | Facility: CLINIC | Age: 49
End: 2024-03-13
Payer: COMMERCIAL

## 2024-03-13 DIAGNOSIS — N95.1 VASOMOTOR SYMPTOMS DUE TO MENOPAUSE: Primary | ICD-10-CM

## 2024-03-13 DIAGNOSIS — C50.919 MALIGNANT NEOPLASM OF FEMALE BREAST, UNSPECIFIED ESTROGEN RECEPTOR STATUS, UNSPECIFIED LATERALITY, UNSPECIFIED SITE OF BREAST: ICD-10-CM

## 2024-03-13 RX ORDER — FEZOLINETANT 45 MG/1
45 TABLET, FILM COATED ORAL DAILY
Qty: 30 TABLET | Refills: 10 | Status: SHIPPED | OUTPATIENT
Start: 2024-03-13

## 2024-03-13 NOTE — TELEPHONE ENCOUNTER
Names of hot flashes since hysterectomy.    History of breast cancer and currently under the care of Dr. Hester.  We discussed the 2 options for hot flashes that are nonhormonal.    Patient opts for Veozah.  Reports having her labs done recently and will drop off results tomorrow to verify her liver enzymes are normal prior to starting veozah

## 2024-04-01 ENCOUNTER — HOSPITAL ENCOUNTER (EMERGENCY)
Facility: HOSPITAL | Age: 49
Discharge: HOME | End: 2024-04-01
Payer: COMMERCIAL

## 2024-04-01 VITALS
HEIGHT: 63 IN | SYSTOLIC BLOOD PRESSURE: 115 MMHG | WEIGHT: 180 LBS | OXYGEN SATURATION: 99 % | TEMPERATURE: 98 F | BODY MASS INDEX: 31.89 KG/M2 | RESPIRATION RATE: 17 BRPM | DIASTOLIC BLOOD PRESSURE: 68 MMHG | HEART RATE: 67 BPM
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
## 2024-04-01 DIAGNOSIS — S09.90XA INJURY OF HEAD, INITIAL ENCOUNTER: Primary | ICD-10-CM

## 2024-04-01 PROCEDURE — 25000003 PHARM REV CODE 250: Performed by: NURSE PRACTITIONER

## 2024-04-01 PROCEDURE — 99283 EMERGENCY DEPT VISIT LOW MDM: CPT

## 2024-04-01 RX ORDER — CYCLOBENZAPRINE HCL 10 MG
10 TABLET ORAL 3 TIMES DAILY PRN
Qty: 15 TABLET | Refills: 0 | Status: SHIPPED | OUTPATIENT
Start: 2024-04-01 | End: 2024-04-06

## 2024-04-01 RX ORDER — METHOCARBAMOL 500 MG/1
1000 TABLET, FILM COATED ORAL
Status: COMPLETED | OUTPATIENT
Start: 2024-04-01 | End: 2024-04-01

## 2024-04-01 RX ADMIN — METHOCARBAMOL 1000 MG: 500 TABLET ORAL at 01:04

## 2024-04-01 NOTE — Clinical Note
"Karey"Kye Brown was seen and treated in our emergency department on 4/1/2024.  She may return to work on 04/02/2024.       If you have any questions or concerns, please don't hesitate to call.      Janessa Orozco, ANYA"
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
## 2024-04-03 ENCOUNTER — PATIENT MESSAGE (OUTPATIENT)
Dept: GASTROENTEROLOGY | Facility: CLINIC | Age: 49
End: 2024-04-03
Payer: COMMERCIAL

## 2024-04-03 ENCOUNTER — TELEPHONE (OUTPATIENT)
Dept: GASTROENTEROLOGY | Facility: CLINIC | Age: 49
End: 2024-04-03
Payer: COMMERCIAL

## 2024-04-03 NOTE — TELEPHONE ENCOUNTER
Patient notified of results via portal. Results sent to PCP. Reminder set to notify if not read by 4/5/24. -kg

## 2024-04-03 NOTE — TELEPHONE ENCOUNTER
Esophagram: prominent prox esoph escape and ineff primary wave, dry swallow prompted peristalsis, no observed reflux.    Notify patient that her esophagram overall looked well. No physical blockage of her esophagus.  Did require a second swallow to clear the liquid from her esophagus.  Continue panto 40 mg, okay to take once daily if on BID.  Notify me if any issues. May need to take small bites, chew food well and drinks plenty of fluids with her meals.  NBP

## 2024-07-15 NOTE — PROGRESS NOTES
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 Diagnoses of Other dysphagia, Gastroesophageal reflux disease, unspecified whether esophagitis present, and Fatty liver were also pertinent to this visit.    PCP: Genevieve Borges       HPI:  This is a 48 y.o. female here for follow up. Patient with h/o hypercontractile esophagus, dysphagia. She states her swallowing has been much improved. She has been having episodes of dizziness and is being evaluated by ENT for vertigo. She is no longer taking pantoprazole and is feeling well. She states she has lost almost 100 pounds with Mounjaro. She states she was 285 prior to starting mounjaro last year. She is interested in repeat imaging due to her history of fatty liver. She states her liver enzymes has been well with PCP.    Esophagram 4/3/2024: prominent prox esoph escape and ineff primary wave, dry swallow prompted peristalsis, no observed reflux.     EGD 2/19/2024 Large amount of solid food in the gastric lumen    7/2021 with Dr. Rossi - transjugular liver biopsy w/portal venous pressure measurements. Portal venous pressure measurements: right atrium 7 mmHg, free hepatic 7 mmHg, wedge hepatic 10 mmHg. She was told the biopsy showed fatty liver w/o cirrhosis     7/2022 GES nl     Esophageal Manometry 7/2022: Esophagogastric outlet obstruction. Patient has elevated resting lower esophageal sphincter pressure but sufficient evidence of peristalsis. Also had 2 swallows with DIC greater than 8000 consistent with hypercontractile or jackhammer esophagus. May consider CCB.     EGD/Colonoscopy 6/21/2022: small HH, small amount of solid partially digested food in gastric lumen. GBx chr inact gitis w/o Hp, EBx nl. IH, otherwise normal colon. Repeat colonoscopy in 10 yr     Outside record reviewed:  [facility] - 2017 MRCP: wnl, fatty liver, no GB. EGD: gitis, reflux eitis, ABx wnl, EBx wnl. HCV FibroSURE F0 A0, ALT 22, GGT 29, Tb 0.2.  HCV/HAV neg, HBV sAg/cIgM/sAb neg, AMA/ZEKE/chol/Fe/T/%/ferr/INR nl. 34/32    Review of Systems   Constitutional:  Positive for fatigue. Negative for fever and unexpected weight change.   HENT:  Positive for postnasal drip. Negative for mouth sores, sore throat and trouble swallowing.    Eyes:  Negative for pain, discharge and eye dryness.   Respiratory:  Negative for apnea, cough, choking, chest tightness, shortness of breath and wheezing.    Cardiovascular:  Positive for chest pain. Negative for palpitations and leg swelling.   Gastrointestinal:  Negative for abdominal distention, abdominal pain, anal bleeding, blood in stool, change in bowel habit, constipation, diarrhea, nausea, rectal pain, vomiting, reflux and fecal incontinence.   Genitourinary:  Negative for bladder incontinence, dysuria and hematuria.   Musculoskeletal:  Positive for back pain and joint swelling. Negative for arthralgias.   Integumentary:  Negative for color change and rash.   Allergic/Immunologic: Negative for environmental allergies and food allergies.   Neurological:  Positive for headaches. Negative for seizures.   Hematological:  Negative for adenopathy. Bruises/bleeds easily.        Past Medical History:   Diagnosis Date    Abnormal Pap smear of cervix     Anxiety     Breast cancer 09/08/2023    Depression     Diabetes mellitus     Fatty liver     Hashimoto's disease     Hypercontractile esophagus     Hypertension     Mild intermittent asthma, uncomplicated     Obesity, Class I, BMI 30.0-34.9 (see actual BMI)     Psoriatic arthritis     Thyroid disease      Past Surgical History:   Procedure Laterality Date    BREAST LUMPECTOMY Left 10/03/2023    CHOLECYSTECTOMY      cold knife cone      COLONOSCOPY      EGD - EXTERNAL RESULT      SHOULDER SURGERY Right     TONSILLECTOMY      TOTAL ABDOMINAL HYSTERECTOMY W/ BILATERAL SALPINGOOPHORECTOMY  12/20/2023     Family History   Problem Relation Name Age of Onset    Hypertension Mother       Heart disease Mother      Diabetes Father      Heart disease Father      Heart disease Brother      Diabetes Brother      Colon polyps Neg Hx      Colon cancer Neg Hx      Crohn's disease Neg Hx      Esophageal cancer Neg Hx      Irritable bowel syndrome Neg Hx      Rectal cancer Neg Hx      Stomach cancer Neg Hx      Ulcerative colitis Neg Hx       Social History     Tobacco Use    Smoking status: Never    Smokeless tobacco: Never   Substance Use Topics    Alcohol use: Yes     Alcohol/week: 1.0 standard drink of alcohol     Types: 1 Glasses of wine per week    Drug use: Never     Review of patient's allergies indicates:   Allergen Reactions    Levofloxacin Other (See Comments)        Medication List with Changes/Refills   Current Medications    ALPRAZOLAM (XANAX) 0.5 MG TABLET    alprazolam 0.5 mg tablet   TAKE 1 TABLET BY MOUTH ONCE DAILY AS NEEDED FOR ANXIETY    CETIRIZINE (ZYRTEC) 10 MG TABLET        EPINEPHRINE (EPIPEN) 0.3 MG/0.3 ML ATIN    epinephrine 0.3 mg/0.3 mL injection, auto-injector    FEZOLINETANT (VEOZAH) 45 MG TAB    Take 1 tablet (45 mg) by mouth once daily.    HYDROXYCHLOROQUINE (PLAQUENIL) 200 MG TABLET        LETROZOLE (FEMARA) 2.5 MG TAB    Take 2.5 mg by mouth.    METFORMIN (GLUCOPHAGE) 500 MG TABLET    Take 500 mg by mouth.    METOPROLOL SUCCINATE (TOPROL-XL) 25 MG 24 HR TABLET    Take 1 tablet (25 mg total) by mouth once daily.    NP THYROID 120 MG TAB    TAKE 2 TABLETS BY MOUTH ONCE DAILY ON AN EMPTY STOMACH    OMALIZUMAB (XOLAIR) 150 MG/ML INJECTION    Inject 1 mg into the skin every 28 days.    TIRZEPATIDE 12.5 MG/0.5 ML PNIJ    Inject 12.5 mg into the skin every 7 days.    TRELEGY ELLIPTA 100-62.5-25 MCG DSDV    INHALE 1 PUFF ONCE DAILY    VALACYCLOVIR (VALTREX) 1000 MG TABLET    Take 2,000 mg by mouth 2 (two) times daily. FOR 1 DAY    VILAZODONE (VIIBRYD) 40 MG TAB TABLET    Take 40 mg by mouth.   Discontinued Medications    ERGOCALCIFEROL (ERGOCALCIFEROL) 50,000 UNIT CAP    Take 1  "capsule by mouth every 7 days.    PANTOPRAZOLE (PROTONIX) 40 MG TABLET    Take 1 tablet (40 mg total) by mouth 2 (two) times daily.    SUCRALFATE (CARAFATE) 100 MG/ML SUSPENSION    Take 10 mLs (1 g total) by mouth 4 (four) times daily before meals and nightly.         Vital Signs:  /70 (BP Location: Right arm, Patient Position: Sitting, BP Method: Large (Automatic))   Pulse 84   Resp 16   Ht 5' 3" (1.6 m)   Wt 88.5 kg (195 lb)   LMP 10/27/2023   SpO2 97%   BMI 34.54 kg/m²         Physical Exam  Vitals reviewed.   Constitutional:       General: She is awake. She is not in acute distress.     Appearance: Normal appearance. She is well-developed. She is not ill-appearing, toxic-appearing or diaphoretic.   HENT:      Head: Normocephalic and atraumatic.      Nose: Nose normal.      Mouth/Throat:      Mouth: Mucous membranes are moist.      Pharynx: Oropharynx is clear. No oropharyngeal exudate or posterior oropharyngeal erythema.   Eyes:      General: Lids are normal. Gaze aligned appropriately. No scleral icterus.        Right eye: No discharge.         Left eye: No discharge.      Conjunctiva/sclera: Conjunctivae normal.   Neck:      Trachea: Trachea normal.   Cardiovascular:      Rate and Rhythm: Normal rate and regular rhythm.      Pulses:           Radial pulses are 2+ on the right side and 2+ on the left side.   Pulmonary:      Effort: Pulmonary effort is normal. No respiratory distress.      Breath sounds: No stridor. No wheezing.   Chest:      Chest wall: No tenderness.   Abdominal:      General: Bowel sounds are normal. There is no distension.      Palpations: Abdomen is soft. There is no fluid wave, hepatomegaly or mass.      Tenderness: There is no abdominal tenderness. There is no guarding or rebound.   Musculoskeletal:         General: No tenderness or deformity.      Cervical back: Full passive range of motion without pain and neck supple. No tenderness.      Right lower leg: No edema.      " Left lower leg: No edema.   Lymphadenopathy:      Cervical: No cervical adenopathy.   Skin:     General: Skin is warm and dry.      Capillary Refill: Capillary refill takes less than 2 seconds.      Coloration: Skin is not cyanotic, jaundiced or pale.   Neurological:      General: No focal deficit present.      Mental Status: She is alert and oriented to person, place, and time.      Motor: No tremor.   Psychiatric:         Attention and Perception: Attention normal.         Mood and Affect: Mood and affect normal.         Speech: Speech normal.         Behavior: Behavior normal. Behavior is cooperative.            All of the data above and below has been reviewed by myself and any further interpretations will be reflected in the assessment and plan.   The data includes review of external notes, and independent interpretation of lab results, procedures, x-rays, and imaging reports.      Assessment:  Jackhammer esophagus    Other dysphagia    Gastroesophageal reflux disease, unspecified whether esophagitis present    Fatty liver  -     US Abdomen Limited; Future; Expected date: 07/16/2024    Hypercontractile esophagus- patient feeling well currently. Recent EGD was normal.  Fatty liver. Will evaluate with RUQ abdominal ultrasound.  Will get liver Bx report.     Recommendations:    Schedule abdominal ultrasound.      Risks, benefits, and alternatives of medical management, any associated procedures, and/or treatment discussed with the patient. Patient given opportunity to ask questions and voices understanding. Patient has elected to proceed with the recommended care modalities as discussed.    Instructed patient to notify my office if they have not been contacted within two weeks after any procedures, submitting any samples (biopsies, blood, stool, urine, etc.) or after any imaging (X-ray, CT, MRI, etc.).     Follow up in about 1 year (around 7/16/2025).    Order summary:  Orders Placed This Encounter   Procedures     US Abdomen Limited      This assessment, plan, and documentation was performed in collaboration with Jenifer Duque NP.     This document may have been created using a voice recognition transcribing system. Incorrect words or phrases may have been missed during proofreading. Please interpret accordingly or contact me for clarification.     Gwen Deluca MD

## 2024-07-16 ENCOUNTER — OFFICE VISIT (OUTPATIENT)
Dept: GASTROENTEROLOGY | Facility: CLINIC | Age: 49
End: 2024-07-16
Payer: COMMERCIAL

## 2024-07-16 VITALS
WEIGHT: 195 LBS | RESPIRATION RATE: 16 BRPM | SYSTOLIC BLOOD PRESSURE: 107 MMHG | DIASTOLIC BLOOD PRESSURE: 70 MMHG | OXYGEN SATURATION: 97 % | HEIGHT: 63 IN | HEART RATE: 84 BPM | BODY MASS INDEX: 34.55 KG/M2

## 2024-07-16 DIAGNOSIS — K21.9 GASTROESOPHAGEAL REFLUX DISEASE, UNSPECIFIED WHETHER ESOPHAGITIS PRESENT: ICD-10-CM

## 2024-07-16 DIAGNOSIS — R13.19 OTHER DYSPHAGIA: ICD-10-CM

## 2024-07-16 DIAGNOSIS — K76.0 FATTY LIVER: ICD-10-CM

## 2024-07-16 DIAGNOSIS — K22.4 JACKHAMMER ESOPHAGUS: Primary | ICD-10-CM

## 2024-07-16 PROCEDURE — 1160F RVW MEDS BY RX/DR IN RCRD: CPT | Mod: CPTII,S$GLB,, | Performed by: INTERNAL MEDICINE

## 2024-07-16 PROCEDURE — 1159F MED LIST DOCD IN RCRD: CPT | Mod: CPTII,S$GLB,, | Performed by: INTERNAL MEDICINE

## 2024-07-16 PROCEDURE — 3008F BODY MASS INDEX DOCD: CPT | Mod: CPTII,S$GLB,, | Performed by: INTERNAL MEDICINE

## 2024-07-16 PROCEDURE — 3078F DIAST BP <80 MM HG: CPT | Mod: CPTII,S$GLB,, | Performed by: INTERNAL MEDICINE

## 2024-07-16 PROCEDURE — 99213 OFFICE O/P EST LOW 20 MIN: CPT | Mod: S$GLB,,, | Performed by: INTERNAL MEDICINE

## 2024-07-16 PROCEDURE — 3074F SYST BP LT 130 MM HG: CPT | Mod: CPTII,S$GLB,, | Performed by: INTERNAL MEDICINE

## 2024-07-16 RX ORDER — LETROZOLE 2.5 MG/1
2.5 TABLET, FILM COATED ORAL
COMMUNITY
Start: 2024-06-20

## 2024-07-16 NOTE — LETTER
July 16, 2024        Genevieve Borges, FNP-C  [address] [facility] [address]             [facility]  [address] [address]  Phone: [phone]  Fax: [phone]   Patient: Karey Brown   MR Number: [id number]   YOB: 1975   Date of Visit: 7/16/2024       Dear Dr. Borges:    Thank you for referring Karey Brown to me for evaluation. Attached you will find relevant portions of my assessment and plan of care.    If you have questions, please do not hesitate to call me. I look forward to following Karey Brown along with you.    Sincerely,      Gwen Deluca MD            CC  No Recipients    Enclosure

## 2024-07-16 NOTE — PATIENT INSTRUCTIONS
Schedule abdominal ultrasound.    Please notify my office if you have not been contacted within two weeks after any procedures, submitting any samples (biopsies, blood, stool, urine, etc.) or after any imaging (X-ray, CT, MRI, etc.).

## 2024-07-17 ENCOUNTER — TELEPHONE (OUTPATIENT)
Dept: GASTROENTEROLOGY | Facility: CLINIC | Age: 49
End: 2024-07-17
Payer: COMMERCIAL

## 2024-08-04 ENCOUNTER — TELEPHONE (OUTPATIENT)
Dept: GASTROENTEROLOGY | Facility: CLINIC | Age: 49
End: 2024-08-04
Payer: COMMERCIAL

## 2024-08-14 DIAGNOSIS — R42 DIZZINESS: ICD-10-CM

## 2024-08-14 DIAGNOSIS — R51.9 NONINTRACTABLE HEADACHE, UNSPECIFIED CHRONICITY PATTERN, UNSPECIFIED HEADACHE TYPE: Primary | ICD-10-CM

## 2025-05-21 ENCOUNTER — OFFICE VISIT (OUTPATIENT)
Dept: SURGERY | Facility: CLINIC | Age: 50
End: 2025-05-21
Payer: COMMERCIAL

## 2025-05-21 DIAGNOSIS — K45.8 OTHER SPECIFIED ABDOMINAL HERNIA WITHOUT OBSTRUCTION OR GANGRENE: ICD-10-CM

## 2025-05-21 DIAGNOSIS — K43.2 VENTRAL INCISIONAL HERNIA WITHOUT OBSTRUCTION OR GANGRENE: Primary | ICD-10-CM

## 2025-05-21 PROCEDURE — 1160F RVW MEDS BY RX/DR IN RCRD: CPT | Mod: CPTII,,, | Performed by: SURGERY

## 2025-05-21 PROCEDURE — 99213 OFFICE O/P EST LOW 20 MIN: CPT | Mod: S$PBB,,, | Performed by: SURGERY

## 2025-05-21 PROCEDURE — 1159F MED LIST DOCD IN RCRD: CPT | Mod: CPTII,,, | Performed by: SURGERY

## 2025-05-21 RX ORDER — METFORMIN HYDROCHLORIDE 750 MG/1
TABLET, EXTENDED RELEASE ORAL
COMMUNITY
Start: 2025-02-20

## 2025-05-21 RX ORDER — TIRZEPATIDE 5 MG/.5ML
INJECTION, SOLUTION SUBCUTANEOUS
COMMUNITY

## 2025-05-21 NOTE — PROGRESS NOTES
History & Physical    SUBJECTIVE:     History of Present Illness:    49-year-old female known to me from a previous visit several years ago with a right upper quadrant incisional hernia in a previous cholecystectomy scar that was done open due to a bile duct injury.  A CT scan was done probably for 5 years ago that showed a supraumbilical right-sided abdominal wall hernia containing some fat and nonobstructing colon.  She continues to have some discomfort in the right upper quadrant area in the subcostal region near the previous incision.  No symptoms of incarceration or obstruction at this time.    Chief Complaint   Patient presents with    Hernia         Review of patient's allergies indicates:  Review of patient's allergies indicates:   Allergen Reactions    Levofloxacin Other (See Comments)       Medications Ordered Prior to Encounter[1]    Past Medical History:   Diagnosis Date    Abnormal Pap smear of cervix     Anxiety     Breast cancer 09/08/2023    Depression     Diabetes mellitus     Fatty liver     Hashimoto's disease     Hypercontractile esophagus     Hypertension     Mild intermittent asthma, uncomplicated     Obesity, Class I, BMI 30.0-34.9 (see actual BMI)     Psoriatic arthritis     Thyroid disease      Past Surgical History:   Procedure Laterality Date    BREAST LUMPECTOMY Left 10/03/2023    CHOLECYSTECTOMY      cold knife cone      COLONOSCOPY      EGD - EXTERNAL RESULT      SHOULDER SURGERY Right     TONSILLECTOMY      TOTAL ABDOMINAL HYSTERECTOMY W/ BILATERAL SALPINGOOPHORECTOMY  12/20/2023     Family History   Problem Relation Name Age of Onset    Hypertension Mother      Heart disease Mother      Diabetes Father      Heart disease Father      Heart disease Brother      Diabetes Brother      Colon polyps Neg Hx      Colon cancer Neg Hx      Crohn's disease Neg Hx      Esophageal cancer Neg Hx      Irritable bowel syndrome Neg Hx      Rectal cancer Neg Hx      Stomach cancer Neg Hx       Ulcerative colitis Neg Hx         Social History[2]       Review of Systems   Constitutional: Negative.    HENT: Negative.     Respiratory: Negative.     Cardiovascular: Negative.    Gastrointestinal: Negative.    Genitourinary: Negative.    Musculoskeletal: Negative.    Neurological: Negative.    Psychiatric/Behavioral: Negative.         OBJECTIVE:     There were no vitals filed for this visit.              Physical Exam:  Physical Exam  Constitutional:       Appearance: She is obese.   Cardiovascular:      Rate and Rhythm: Normal rate and regular rhythm.   Pulmonary:      Effort: Pulmonary effort is normal.      Breath sounds: Normal breath sounds.   Abdominal:      General: Abdomen is flat. Bowel sounds are normal.      Palpations: Abdomen is soft.      Hernia: A hernia is present.          Comments: Right upper quadrant subcostal incision noted.  Bulge is noted in the right upper quadrant region in the area of the incision.  Difficult to feel due to patient size.   Musculoskeletal:         General: Normal range of motion.      Cervical back: Normal range of motion.   Skin:     General: Skin is warm and dry.   Neurological:      General: No focal deficit present.      Mental Status: She is alert and oriented to person, place, and time.             ASSESSMENT/PLAN:   Ventral incisional hernia  PLAN:  We will repeat CT scan of the abdomen to define where exactly the hernia is in the contents.  Following this then surgical plans can be finalized.  We will also discussed with patient once CT is done possibly robotic repair of this hernia.               [1]   Current Outpatient Medications on File Prior to Visit   Medication Sig Dispense Refill    ALPRAZolam (XANAX) 0.5 MG tablet alprazolam 0.5 mg tablet   TAKE 1 TABLET BY MOUTH ONCE DAILY AS NEEDED FOR ANXIETY      letrozole (FEMARA) 2.5 mg Tab Take 2.5 mg by mouth.      metFORMIN (GLUCOPHAGE-XR) 750 MG ER 24hr tablet       metoprolol succinate (TOPROL-XL) 25 MG 24  hr tablet Take 1 tablet (25 mg total) by mouth once daily. 30 tablet 5    NP THYROID 120 mg Tab TAKE 2 TABLETS BY MOUTH ONCE DAILY ON AN EMPTY STOMACH      omalizumab (XOLAIR) 150 mg/mL injection Inject 1 mg into the skin every 28 days.      cetirizine (ZYRTEC) 10 MG tablet       EPINEPHrine (EPIPEN) 0.3 mg/0.3 mL AtIn epinephrine 0.3 mg/0.3 mL injection, auto-injector      fezolinetant (VEOZAH) 45 mg Tab Take 1 tablet (45 mg) by mouth once daily. 30 tablet 10    hydrOXYchloroQUINE (PLAQUENIL) 200 mg tablet       metFORMIN (GLUCOPHAGE) 500 MG tablet Take 500 mg by mouth.      MOUNJARO 5 mg/0.5 mL PnIj INJECT 1 SYRINGE SUBCUTANEOUSLY ONCE A WEEK FOR 28 DAYS      tirzepatide 12.5 mg/0.5 mL PnIj Inject 12.5 mg into the skin every 7 days. 4 pen 1    TRELEGY ELLIPTA 100-62.5-25 mcg DsDv INHALE 1 PUFF ONCE DAILY      valACYclovir (VALTREX) 1000 MG tablet Take 2,000 mg by mouth 2 (two) times daily. FOR 1 DAY      vilazodone (VIIBRYD) 40 mg Tab tablet Take 40 mg by mouth.       No current facility-administered medications on file prior to visit.   [2]   Social History  Socioeconomic History    Marital status:    Tobacco Use    Smoking status: Never    Smokeless tobacco: Never   Substance and Sexual Activity    Alcohol use: Yes     Alcohol/week: 1.0 standard drink of alcohol     Types: 1 Glasses of wine per week    Drug use: Never    Sexual activity: Yes     Partners: Male     Birth control/protection: See Surgical Hx     Social Drivers of Health     Financial Resource Strain: Low Risk  (4/7/2025)    Received from [facility] and Its Subsidiaries and Affiliates    Overall Financial Resource Strain (CARDIA)     Difficulty of Paying Living Expenses: Not hard at all   Food Insecurity: No Food Insecurity (4/7/2025)    Received from [facility] and Its Subsidiaries and Affiliates    Hunger Vital Sign     Worried About Running Out of Food in the  Last Year: Never true     Ran Out of Food in the Last Year: Never true   Transportation Needs: No Transportation Needs (4/7/2025)    [facility]    PRAPARE - Transportation     Lack of Transportation (Medical): No     Lack of Transportation (Non-Medical): No   Physical Activity: Insufficiently Active (4/7/2025)    [facility]    Exercise Vital Sign     Days of Exercise per Week: 2 days     Minutes of Exercise per Session: 10 min   Stress: No Stress Concern Present (4/7/2025)    [facility]    [employer] - Occupational Stress Questionnaire     Feeling of Stress : Only a little   Housing Stability: Unknown (4/7/2025)    [facility]    Housing Stability Vital Sign     Unable to Pay for Housing in the Last Year: No     Homeless in the Last Year: No

## 2025-06-02 ENCOUNTER — OFFICE VISIT (OUTPATIENT)
Dept: SURGERY | Facility: CLINIC | Age: 50
End: 2025-06-02
Payer: COMMERCIAL

## 2025-06-02 DIAGNOSIS — K43.2 VENTRAL INCISIONAL HERNIA WITHOUT OBSTRUCTION OR GANGRENE: Primary | ICD-10-CM

## 2025-06-05 ENCOUNTER — OUTSIDE PLACE OF SERVICE (OUTPATIENT)
Dept: SURGERY | Facility: CLINIC | Age: 50
End: 2025-06-05

## 2025-06-16 ENCOUNTER — OFFICE VISIT (OUTPATIENT)
Dept: SURGERY | Facility: CLINIC | Age: 50
End: 2025-06-16
Payer: COMMERCIAL

## 2025-06-16 DIAGNOSIS — K43.2 VENTRAL INCISIONAL HERNIA WITHOUT OBSTRUCTION OR GANGRENE: Primary | ICD-10-CM

## 2025-06-16 PROCEDURE — 1160F RVW MEDS BY RX/DR IN RCRD: CPT | Mod: CPTII,,, | Performed by: SURGERY

## 2025-06-16 PROCEDURE — 99213 OFFICE O/P EST LOW 20 MIN: CPT | Mod: S$PBB,,, | Performed by: SURGERY

## 2025-06-16 PROCEDURE — 1159F MED LIST DOCD IN RCRD: CPT | Mod: CPTII,,, | Performed by: SURGERY

## 2025-06-16 NOTE — PROGRESS NOTES
Subjective:       Patient ID: Karey Brown is a 49 y.o. female.    Chief Complaint: Post-op Evaluation (Post op ventral hernia)      Patient doing well, still having some occasional pains but overall slowly improving      Review of Systems   Constitutional:  Negative for chills and fever.   HENT:  Negative for nasal congestion and rhinorrhea.    Eyes:  Negative for discharge and visual disturbance.   Respiratory:  Negative for shortness of breath and wheezing.    Cardiovascular:  Negative for chest pain and palpitations.   Gastrointestinal: Negative.  Negative for abdominal distention, abdominal pain, anal bleeding, blood in stool, constipation, diarrhea, nausea, rectal pain and vomiting.   Endocrine: Negative for cold intolerance and heat intolerance.   Genitourinary:  Negative for difficulty urinating and frequency.   Musculoskeletal:  Negative for back pain and myalgias.   Integumentary:  Negative for pallor and rash.   Neurological:  Negative for dizziness and headaches.   Hematological:  Negative for adenopathy. Does not bruise/bleed easily.   Psychiatric/Behavioral:  Negative for behavioral problems. The patient is not nervous/anxious.          Objective:      Physical Exam  Constitutional:       Appearance: Normal appearance. She is well-developed.   HENT:      Head: Normocephalic and atraumatic.   Eyes:      General: Lids are normal.      Conjunctiva/sclera: Conjunctivae normal.   Neck:      Trachea: Trachea normal.   Cardiovascular:      Rate and Rhythm: Normal rate and regular rhythm.      Heart sounds: Normal heart sounds.   Pulmonary:      Effort: Pulmonary effort is normal.      Breath sounds: Normal breath sounds.   Abdominal:      General: Bowel sounds are normal. There is no distension.      Palpations: Abdomen is soft.      Tenderness: There is no abdominal tenderness.      Hernia: No hernia is present.          Comments: Incisions healing well no signs of recurrence   Musculoskeletal:      Cervical  back: Normal range of motion.   Skin:     General: Skin is warm and dry.   Neurological:      Mental Status: She is alert and oriented to person, place, and time.   Psychiatric:         Speech: Speech normal.         Behavior: Behavior normal. Behavior is cooperative.         Assessment:       Problem List Items Addressed This Visit    None  Visit Diagnoses         Ventral incisional hernia without obstruction or gangrene    -  Primary            Plan:       Instructed patient to refrain from any heavy lifting for the next 4 weeks, will follow up on a PRN basis